# Patient Record
Sex: MALE | Race: WHITE | NOT HISPANIC OR LATINO | Employment: OTHER | URBAN - METROPOLITAN AREA
[De-identification: names, ages, dates, MRNs, and addresses within clinical notes are randomized per-mention and may not be internally consistent; named-entity substitution may affect disease eponyms.]

---

## 2017-04-25 ENCOUNTER — TELEPHONE (OUTPATIENT)
Dept: ALLERGY | Facility: CLINIC | Age: 72
End: 2017-04-25

## 2017-04-25 NOTE — TELEPHONE ENCOUNTER
"Patient has history of "angioedema" per his wife.  He is having continued swelling in his face.  She states he cannot take the Zyrtec as it makes him too drowsy and he cannot function.  They are asking for alternatives.  Also discussed patient needing an appointment for a follow up.    Spoke with SOLOMON Cotton.  She states to have patient start on Allegra one in the morning and at lunchtime, and then to take the Zyrtec in the evening.  Patient given instructions and appt made for 5/8/17  "

## 2017-04-25 NOTE — TELEPHONE ENCOUNTER
----- Message from Vicki Donahue sent at 4/24/2017  9:43 AM CDT -----  Contact: Self/[phone] home  Pt said that he is calling in regards to needing to speak with More Burton about a problem he has been having with Edema, pt stated that he is taking medication and needs advise before he makes an appointment. Please call and advise              Thank you

## 2017-05-08 ENCOUNTER — LAB VISIT (OUTPATIENT)
Dept: LAB | Facility: HOSPITAL | Age: 72
End: 2017-05-08
Payer: MEDICARE

## 2017-05-08 ENCOUNTER — OFFICE VISIT (OUTPATIENT)
Dept: ALLERGY | Facility: CLINIC | Age: 72
End: 2017-05-08
Payer: MEDICARE

## 2017-05-08 VITALS
BODY MASS INDEX: 26.15 KG/M2 | WEIGHT: 186.75 LBS | OXYGEN SATURATION: 96 % | HEIGHT: 71 IN | HEART RATE: 72 BPM | SYSTOLIC BLOOD PRESSURE: 124 MMHG | DIASTOLIC BLOOD PRESSURE: 86 MMHG

## 2017-05-08 DIAGNOSIS — T78.3XXD ANGIOEDEMA, SUBSEQUENT ENCOUNTER: ICD-10-CM

## 2017-05-08 DIAGNOSIS — J30.89 ALLERGIC RHINITIS DUE TO OTHER ALLERGEN: Primary | ICD-10-CM

## 2017-05-08 DIAGNOSIS — L50.1 CHRONIC IDIOPATHIC URTICARIA: ICD-10-CM

## 2017-05-08 PROBLEM — T78.3XXA ANGIOEDEMA: Status: ACTIVE | Noted: 2017-05-08

## 2017-05-08 PROCEDURE — 99999 PR PBB SHADOW E&M-EST. PATIENT-LVL III: CPT | Mod: PBBFAC,,, | Performed by: ALLERGY & IMMUNOLOGY

## 2017-05-08 PROCEDURE — 86376 MICROSOMAL ANTIBODY EACH: CPT

## 2017-05-08 PROCEDURE — 36415 COLL VENOUS BLD VENIPUNCTURE: CPT

## 2017-05-08 RX ORDER — MONTELUKAST SODIUM 10 MG/1
10 TABLET ORAL NIGHTLY
Qty: 30 TABLET | Refills: 11 | Status: SHIPPED | OUTPATIENT
Start: 2017-05-08 | End: 2017-06-07

## 2017-05-08 NOTE — PROGRESS NOTES
Subjective:       Patient ID: Kvng Redd is a 71 y.o. male.    Chief Complaint: Follow-up (angioedema, comes and goes, facial, last night  jaw left side, about a month ago. )    HPI Comments: Patient is known to me from private practice he is new to me in the [facility] system.  However, [facility] policy dictates chart is stored off site and not available at the time of this visit, stored at [facility]. He is followed for Chronic idiopathic urticaria, angioedema, and generalized pruritis. Antihistamines increase sedation for him, so antihistamine choices are limited for avoid day time somnolance, fatigue and malaise. He has mild occasional episodes of angioedema. However he feels he is 90% symptom managed with his current medication regemin of Zyrtec 1 in Am, Zyrtec 2 in PM. He has a friend from [place] who is an MD visiting in their home. They had additional question about the use of the following medications Singulair, Astelin, Nasonex, Alpha-1 antihistamines, amitriptyline and doxepin. Reviewed clinical effectiveness, side effects, and risk v. Benefit specific to patient. All questions answered. He has mild intermittent allergic rhinitis symptoms with increased rhinorrhea, nasal congestion, sinus pressure in the last 2 months.    Review of Systems   Constitutional: Negative.  Negative for activity change, appetite change, chills, diaphoresis, fatigue, fever and unexpected weight change.   HENT: Negative.  Negative for congestion, dental problem, drooling, ear discharge, ear pain, facial swelling, hearing loss, mouth sores, nosebleeds, postnasal drip, rhinorrhea, sinus pressure, sneezing, sore throat, tinnitus, trouble swallowing and voice change.         Chronic idiopathic angioedema, urticaria, and pruritis   Eyes: Negative.  Negative for photophobia, pain, discharge, redness, itching and visual disturbance.   Respiratory: Negative.  Negative for apnea, cough, choking, chest tightness, shortness of breath,  wheezing and stridor.    Cardiovascular: Negative.  Negative for chest pain, palpitations and leg swelling.   Gastrointestinal: Negative.  Negative for abdominal distention, abdominal pain, constipation, diarrhea, nausea and vomiting.   Endocrine: Negative.  Negative for cold intolerance, heat intolerance, polydipsia, polyphagia and polyuria.   Genitourinary: Negative.  Negative for decreased urine volume, difficulty urinating, dysuria, enuresis, frequency and urgency.   Musculoskeletal: Negative.  Negative for arthralgias, back pain, gait problem, joint swelling, myalgias, neck pain and neck stiffness.   Skin: Negative.  Negative for color change, pallor, rash and wound.   Allergic/Immunologic: Negative.  Negative for environmental allergies, food allergies and immunocompromised state.   Neurological: Negative.  Negative for dizziness, tremors, seizures, syncope, facial asymmetry, speech difficulty, weakness, light-headedness, numbness and headaches.   Hematological: Negative.  Negative for adenopathy. Does not bruise/bleed easily.   Psychiatric/Behavioral: Negative.  Negative for agitation, behavioral problems, confusion, decreased concentration, dysphoric mood, hallucinations, self-injury, sleep disturbance and suicidal ideas. The patient is not nervous/anxious and is not hyperactive.      Objective:   Physical Exam   Constitutional: He is oriented to person, place, and time. He appears well-developed and well-nourished. He is active and cooperative.  Non-toxic appearance. He does not have a sickly appearance. He does not appear ill. No distress.   HENT:   Head: Normocephalic and atraumatic. Head is without abrasion, without right periorbital erythema and without left periorbital erythema.   Right Ear: Hearing, tympanic membrane, external ear and ear canal normal. No lacerations. No drainage, swelling or tenderness. No foreign bodies. No mastoid tenderness. Tympanic membrane is not injected, not scarred, not  perforated, not erythematous, not retracted and not bulging. Tympanic membrane mobility is normal. No middle ear effusion. No decreased hearing is noted.   Left Ear: Hearing, tympanic membrane, external ear and ear canal normal. No lacerations. No drainage, swelling or tenderness. No foreign bodies. No mastoid tenderness. Tympanic membrane is not injected, not scarred, not perforated, not erythematous, not retracted and not bulging. Tympanic membrane mobility is normal.  No middle ear effusion. No decreased hearing is noted.   Nose: Nose normal. No mucosal edema, rhinorrhea, nose lacerations, sinus tenderness, nasal deformity, septal deviation or nasal septal hematoma. No epistaxis.  No foreign bodies. Right sinus exhibits no maxillary sinus tenderness and no frontal sinus tenderness. Left sinus exhibits no maxillary sinus tenderness and no frontal sinus tenderness.   Mouth/Throat: Uvula is midline, oropharynx is clear and moist and mucous membranes are normal. He does not have dentures. No oral lesions. No trismus in the jaw. No dental abscesses, uvula swelling, lacerations or dental caries. No oropharyngeal exudate, posterior oropharyngeal edema, posterior oropharyngeal erythema or tonsillar abscesses. No tonsillar exudate.   No angioedema, urticaria, or pruritis present today   Eyes: Conjunctivae, EOM and lids are normal. Pupils are equal, round, and reactive to light. Right eye exhibits no chemosis, no discharge and no exudate. Left eye exhibits no chemosis, no discharge and no exudate. Right conjunctiva is not injected. Right conjunctiva has no hemorrhage. Left conjunctiva is not injected. Left conjunctiva has no hemorrhage. No scleral icterus.   Neck: Trachea normal, normal range of motion, full passive range of motion without pain and phonation normal. No tracheal tenderness and no muscular tenderness present. No rigidity. No tracheal deviation, no edema, no erythema and normal range of motion present. No  thyroid mass and no thyromegaly present.   Cardiovascular: Normal rate, regular rhythm, normal heart sounds and normal pulses.  Exam reveals no gallop and no friction rub.    No murmur heard.  Pulmonary/Chest: Effort normal and breath sounds normal. No accessory muscle usage or stridor. No apnea, no tachypnea and no bradypnea. He has no decreased breath sounds. He has no wheezes. He has no rhonchi. He has no rales. He exhibits no tenderness.   Abdominal: Soft. Normal appearance and bowel sounds are normal. He exhibits no distension. There is no tenderness. There is no rigidity, no rebound, no guarding and no CVA tenderness.   Musculoskeletal: Normal range of motion. He exhibits no edema, tenderness or deformity.   Lymphadenopathy:        Head (right side): No submental, no submandibular, no tonsillar, no preauricular, no posterior auricular and no occipital adenopathy present.        Head (left side): No submental, no submandibular, no tonsillar, no preauricular, no posterior auricular and no occipital adenopathy present.     He has no cervical adenopathy.        Right cervical: No superficial cervical, no deep cervical and no posterior cervical adenopathy present.       Left cervical: No superficial cervical, no deep cervical and no posterior cervical adenopathy present.        Right: No supraclavicular and no epitrochlear adenopathy present.        Left: No supraclavicular and no epitrochlear adenopathy present.   Neurological: He is alert and oriented to person, place, and time. He has normal strength. He is not disoriented. No cranial nerve deficit. He exhibits normal muscle tone. Coordination and gait normal.   Skin: Skin is warm and dry. No abrasion, no bruising, no laceration, no lesion, no petechiae, no purpura and no rash noted. Rash is not macular, not papular, not maculopapular, not nodular, not pustular, not vesicular and not urticarial. He is not diaphoretic. No cyanosis or erythema. No pallor. Nails  show no clubbing.   Psychiatric: He has a normal mood and affect. His speech is normal and behavior is normal. Judgment and thought content normal. His mood appears not anxious. His affect is not inappropriate. Cognition and memory are normal. He does not express impulsivity or inappropriate judgment. He expresses no homicidal and no suicidal ideation. He is attentive.   Nursing note and vitals reviewed.    Assessment:     1. Allergic rhinitis due to other allergen    2. Angioedema, subsequent encounter    3. Chronic idiopathic urticaria      Plan:   Kvng was seen today for follow-up.    Diagnoses and all orders for this visit:    Allergic rhinitis due to other allergen  -     montelukast (SINGULAIR) 10 mg tablet; Take 1 tablet (10 mg total) by mouth every evening.    Angioedema, subsequent encounter  -     montelukast (SINGULAIR) 10 mg tablet; Take 1 tablet (10 mg total) by mouth every evening.  -     CU (Chronic Urticaria) Index Panel; Future    Chronic idiopathic urticaria  -     montelukast (SINGULAIR) 10 mg tablet; Take 1 tablet (10 mg total) by mouth every evening.  -     CU (Chronic Urticaria) Index Panel; Future    Continue Zyrtec 10 1 tab in the AM with Allegra 180 mg 1 tab midday with Zyrtec 10 mg 2 tabs at hour of sleep  Start Singulair 10 mg 1 tab every 24 hours    Continue Benadryl 25-50 mg 1-2 tab every 4 hours as needed for break through itching/swelling  Lab work for CU index        Other options for symptom management:    Zantac or Pepcid is an H2 blocker helps in some cases.    Consider Doxepin at a later date, but is sedating    Astelin topically nasally would be minimally helpful only with eye swelling and immediate area around the nasal passageway    Return in about 6 months (around 11/8/2017) for as long as well, sooner if symptomatic..    Discussed options and strategies  Reviewed medications risk v. Benefit  Reviewed pathophysiology  All questions answered  Emotional support provided  Pt  verbalized understanding of all the above and treatment plan.      SOLOMON Gutierrez

## 2017-05-08 NOTE — PATIENT INSTRUCTIONS
Continue Zyrtec 10 1 tab in the AM with Allegra 180 mg 1 tab midday with Zyrtec 10 mg 2 tabs at hour of sleep  Start Singulair 10 mg 1 tab every 24 hours    Continue Benadryl 25-50 mg 1-2 tab every 4 hours as needed for break through itching/swelling  Lab work for CU index        Other options for symptom management:    Zantac or Pepcid is an H2 blocker helps in some cases.    Consider Doxepin at a later date, but is sedating    Astelin topically nasally would be helpful only with eye swelling and immediate area around the nasal passageway

## 2017-05-08 NOTE — MR AVS SNAPSHOT
[facility]  [address]  [facility] [address]  Phone: [phone]  Fax: [phone]                  Kvng Redd   2017 9:30 AM   Office Visit    Description:  Male : 1945   Provider:  SOLOMON Gutierrez   Department:  [facility]           Reason for Visit     Follow-up           Diagnoses this Visit        Comments    Allergic rhinitis due to other allergen    -  Primary     Angioedema, subsequent encounter         Chronic idiopathic urticaria                To Do List           Goals (5 Years of Data)     None      Follow-Up and Disposition     Return in about 6 months (around 2017) for as long as well, sooner if symptomatic..       These Medications        Disp Refills Start End    montelukast (SINGULAIR) 10 mg tablet 30 tablet 11 2017    Take 1 tablet (10 mg total) by mouth every evening. - Oral    Pharmacy: [facility] - [address] Ph #: [phone]         [facility]     [facility] On Call Nurse Care Line -  Assistance  Unless otherwise directed by your provider, please contact [facility], our nurse care line that is available for  assistance.     Registered nurses in the [facility] Center provide: appointment scheduling, clinical advisement, health education, and other advisory services.  Call: [phone] (toll free)               Medications           Message regarding Medications     Verify the changes and/or additions to your medication regime listed below are the same as discussed with your clinician today.  If any of these changes or additions are incorrect, please notify your healthcare provider.        START taking these NEW medications        Refills    montelukast (SINGULAIR) 10 mg tablet 11    Sig: Take 1 tablet (10 mg total) by mouth every evening.    Class: Normal    Route: Oral      STOP taking these medications     B-complex  "with vitamin C (Z-BEC OR EQUIV) tablet Take 1 tablet by mouth once daily.    UNABLE TO FIND medication name: Glucosamine HCL 1,500 - MSM 1,500   Take one by mouth two times per day    PATADAY 0.2 % Drop            Verify that the below list of medications is an accurate representation of the medications you are currently taking.  If none reported, the list may be blank. If incorrect, please contact your healthcare provider. Carry this list with you in case of emergency.           Current Medications     doxazosin (CARDURA) 4 MG tablet     ibuprofen (ADVIL,MOTRIN) 200 MG tablet Take 800 mg by mouth every 6 (six) hours as needed for Pain.    MULTIVITAMIN ORAL Take by mouth.    [id number] oil 786--800 mg-mg-mcg-mcg Cap Take 600 mg by mouth once daily.    montelukast (SINGULAIR) 10 mg tablet Take 1 tablet (10 mg total) by mouth every evening.           Clinical Reference Information           Your Vitals Were     BP Pulse Height Weight SpO2 BMI    124/86 (BP Location: Right arm, Patient Position: Sitting, BP Method: Manual) 72 5' 11" (1.803 m) 84.7 kg (186 lb 11.7 oz) 96% 26.04 kg/m2      Blood Pressure          Most Recent Value    BP  124/86      Allergies as of 5/8/2017     No Known Allergies      Immunizations Administered on Date of Encounter - 5/8/2017     None      Orders Placed During Today's Visit     Future Labs/Procedures Expected by Expires    CU (Chronic Urticaria) Index Panel  5/8/2017 7/7/2018      [facility] Sign-Up     Activating your [facility] account is as easy as 1-2-3!     1) Visit [email or web address], select Sign Up Now, enter this activation code and your date of birth, then select Next.  [email or web address]  Expires: 6/22/2017 11:13 AM      2) Create a username and password to use when you visit [facility] in the future and select a security question in case you lose your password and select Next.    3) Enter your e-mail address and click Sign Up!    Additional Information  If you " have questions, please e-mail [email or web address] or call [phone] to talk to our [facility] staff. Remember, [facility] is NOT to be used for urgent needs. For medical emergencies, dial 911.         Instructions    Continue Zyrtec 10 1 tab in the AM with Allegra 180 mg 1 tab midday with Zyrtec 10 mg 2 tabs at hour of sleep  Start Singulair 10 mg 1 tab every 24 hours    Continue Benadryl 25-50 mg 1-2 tab every 4 hours as needed for break through itching/swelling  Lab work for CU index        Other options for symptom management:    Zantac or Pepcid is an H2 blocker helps in some cases.    Consider Doxepin at a later date, but is sedating    Astelin topically nasally would be helpful only with eye swelling and immediate area around the nasal passageway         Language Assistance Services     ATTENTION: Language assistance services are available, free of charge. Please call [phone].      ATENCIÓN: Si habla kirk, tiene a tavera disposición servicios gratuitos de asistencia lingüística. Llame al [phone].     CHÚ Ý: N?u b?n nói Ti?ng Vi?t, có các d?ch v? h? tr? ngôn ng? mi?n phí dành cho b?n. G?i s? [phone].         [facility] complies with applicable Federal civil rights laws and does not discriminate on the basis of race, color, national origin, age, disability, or sex.

## 2017-05-12 LAB
CU INDEX: 2
CU, ANTI-THYROGLOBULIN IGG: <20 IU/ML
CU, ANTI-THYROID PEROXIDASE IGG: 14 IU/ML
CU, TSH (THYROTROPIN): 1.57 UIU/ML (ref 0.4–4)

## 2017-11-09 ENCOUNTER — OFFICE VISIT (OUTPATIENT)
Dept: ALLERGY | Facility: CLINIC | Age: 72
End: 2017-11-09
Payer: MEDICARE

## 2017-11-09 VITALS
SYSTOLIC BLOOD PRESSURE: 122 MMHG | DIASTOLIC BLOOD PRESSURE: 72 MMHG | HEIGHT: 71 IN | WEIGHT: 190.69 LBS | OXYGEN SATURATION: 98 % | HEART RATE: 76 BPM | BODY MASS INDEX: 26.7 KG/M2

## 2017-11-09 DIAGNOSIS — T78.3XXA ANGIOEDEMA, INITIAL ENCOUNTER: Primary | Chronic | ICD-10-CM

## 2017-11-09 DIAGNOSIS — J30.89 ALLERGIC RHINITIS DUE TO HOUSE DUST MITE: Chronic | ICD-10-CM

## 2017-11-09 DIAGNOSIS — L29.8 OTHER PRURITUS: Chronic | ICD-10-CM

## 2017-11-09 DIAGNOSIS — L50.1 CHRONIC IDIOPATHIC URTICARIA: Chronic | ICD-10-CM

## 2017-11-09 PROCEDURE — 99214 OFFICE O/P EST MOD 30 MIN: CPT | Mod: S$GLB,,, | Performed by: ALLERGY & IMMUNOLOGY

## 2017-11-09 PROCEDURE — 99999 PR PBB SHADOW E&M-EST. PATIENT-LVL III: CPT | Mod: PBBFAC,,, | Performed by: ALLERGY & IMMUNOLOGY

## 2017-11-09 RX ORDER — GLUCOSAMINE/CHONDRO SU A 500-400 MG
1 TABLET ORAL DAILY
COMMUNITY

## 2017-11-09 RX ORDER — LANOLIN ALCOHOL/MO/W.PET/CERES
1000 CREAM (GRAM) TOPICAL DAILY
COMMUNITY

## 2017-11-09 RX ORDER — MONTELUKAST SODIUM 10 MG/1
TABLET ORAL
COMMUNITY
Start: 2017-10-07 | End: 2018-02-28 | Stop reason: SDUPTHER

## 2017-11-09 RX ORDER — CYANOCOBALAMIN (VITAMIN B-12) 500 MCG
10 TABLET ORAL NIGHTLY
COMMUNITY
End: 2022-09-07 | Stop reason: DRUGHIGH

## 2017-11-09 RX ORDER — CETIRIZINE HYDROCHLORIDE 10 MG/1
10 TABLET ORAL NIGHTLY
COMMUNITY
End: 2022-01-20 | Stop reason: SDUPTHER

## 2017-11-09 NOTE — LETTER
November 13, 2017        Hemanth Rajan MD  [address] [place] [address] [place] [address]             [place] - All[facility]  [address] [facility]  [place] [address]  Phone: [phone]   Patient: Kvng Redd   MR Number: [id number]   YOB: 1945   Date of Visit: 11/9/2017       Dear Dr. Rajan:    I saw your patient today for a follow-up visit with respect to the following conditions:    1. Angioedema: Active treatment; symptoms quiescent     2. Chronic idiopathic urticaria: Quiescent     3. Allergic rhinitis due to house dust mite: Quiescent on medication     4. Generalized pruritus: Quiescent on medication         I have made the following changes in medications:    Patient Instructions   1.  Patient is to continue Singulair 10 mg every 24 hours  2.  Patient may either use Zyrtec 10 mg twice a day or Zyrtec 10 mg 2 tablets at bedtime.  At a later date he may also try decreasing his Zyrtec to just 10 mg at bedtime.  3.  Safety of these medicines long-term was discussed with the patient.  Revisit is requested as needed.  Patient understands he may contact me if there are problems.      I have requested a follow-up visit as needed to assess this patient's progress.    I hope you find this information helpful in the care of your patient. If you have questions about the above, please do not hesitate to contact me.    Sincerely,    Latisha Garsia MD      [facility]

## 2017-11-09 NOTE — PROGRESS NOTES
Referring physician: No ref. provider found    Primary Care Physician: Hemanth Rajan MD    Chief Complaint: Follow-up (no issues since starting on singulair and montelukast, ) and Allergic Rhinitis  (seems to be controlled with Zyrtec)    Patient is known to me. The last visit was 5/8/2017  Patient is accompanied: No  Diagnosis at previous visit:  1. Allergic rhinitis due to other allergen    2. Angioedema, subsequent encounter    3. Chronic idiopathic urticaria        Subjective:         HPI    Kvng Redd is a 72 y.o. male here for a follow-up visit related to angioedema and ALLERGIC rhinitis treatment.  Patient states that since he saw Priscilla on 5/8/2017 and started Singulair 10 mg every 24 hours along with Zyrtec 10 mg twice a day he has had no episodes of angioedema nor has he had any issues with his ALLERGIC rhinitis.  He states his combination of medicine controls his symptoms completely.  However he does state that he feels somewhat sedated during the daytime; he thinks this may be related to taking Zyrtec in the a.m.  He is wondering if there is another option for taking his Zyrtec.  Patient has positive prick skin test to  dust mites on 4/26/2016.  His lab for angioedema done at [facility] on 4/20/2016 was all normal; his CU index done at [facility] was also normal.  His prick skin test to foods on 4/26 /2016 was negative.    Summary of visit on 5/8/2017 with [facility]:  Follow-up (angioedema, comes and goes, facial, last night  jaw left side, about a month ago. )  HPI Comments: Patient is known to me from private practice he is new to me in the [facility] system.  However, [facility] policy dictates chart is stored off site and not available at the time of this visit, stored at [facility]. He is followed for Chronic idiopathic urticaria, angioedema, and generalized pruritis. Antihistamines increase sedation for him, so antihistamine choices are limited for avoid day time somnolance, fatigue and malaise. He has  mild occasional episodes of angioedema. However he feels he is 90% symptom managed with his current medication regemin of Zyrtec 1 in Am, Zyrtec 2 in PM. He has a friend from [place] who is an MD visiting in their home. They had additional question about the use of the following medications Singulair, Astelin, Nasonex, Alpha-1 antihistamines, amitriptyline and doxepin. Reviewed clinical effectiveness, side effects, and risk v. Benefit specific to patient. All questions answered. He has mild intermittent allergic rhinitis symptoms with increased rhinorrhea, nasal congestion, sinus pressure in the last 2 months.  Assessment:  1. Allergic rhinitis due to other allergen    2. Angioedema, subsequent encounter    3. Chronic idiopathic urticaria    Plan:  Continue Zyrtec 10 1 tab in the AM with Allegra 180 mg 1 tab midday with Zyrtec 10 mg 2 tabs at hour of sleep  Start Singulair 10 mg 1 tab every 24 hours  Continue Benadryl 25-50 mg 1-2 tab every 4 hours as needed for break through itching/swelling  Lab work for CU index  Component      Latest Ref Rng & Units 5/8/2017   CU, Anti-Thyroid Peroxidase IgG      <35 IU/mL 14   CU, Anti-Thyroglobulin IgG      <40 IU/mL <20   CU, Tsh (Thyrotropin)      0.4 - 4 uIU/mL 1.57   CU Index      <10 2.0         Review of Systems  Constitutional: Negative for changes in appetite, unintentional weight loss, fever, chills and fatigue.   HENT: Negative for facial pain, nose bleeds, nasal congestion, postnasal drip, throat clearing, sinus pressure, and voice change. Negative for ear discharge, ear pain, facial swelling, sore throat and trouble swallowing.  Eyes: Negative for occular discharge, redness, itching and visual disturbance.  Respiratory: Negative for chest tightness, shortness of breath, wheezing, dyspnea on exertion, sputum production and cough.   Cardiovascular: Negative for chest pain, palpatations and leg swelling.  Gastrointestinal: Negative for abdominal distension,  abdominal pain, constipation, diarrhea, nausea,and vomiting.   Genitourinary: Negative for difficulty urinating.   Musculoskeletal: Negative for arthralgias, gait problems, joint swelling, myalgias and back pain.   Neurological: Negative for dizziness, syncope, weakness, light-headedness, and headaches.   Hematological: Negative for adenopathy, does not bruise or bleed easily.  Psychiatric/Behavioral: Negative for agitation, anxiety, behavioral problems, confusion, and insomnia.  Skin: Negative for rash.     PMH:  Reviewed with the patient and current for this visit    Family History:  Reviewed with the patient and current for this visit    Social History:  Reviewed with the patient and current for this visit     Environmental History:  Reviewed with the patient and current for this visit          Objective:      Skin Test results: Positive to house dust mites; test was prick skin test done on 4/28/2016.  Skin test for foods done on the same day was negative.The positive histamine control (3+) and the negative saline control (0), indicate that this was a valid assessment of allergic recognition.    Immunotherapy: Patient has not been on allergen specific immunotherapy    Physical Exam  General:patient is well developed and well nourished, in no acute distress.  Mental Status:  Alert, oriented and cooperative  Head and Face: normocephalic   Allergic shiners: No  Eyes:   Pupils: ERRLA: Scleral conjunctiva: clear; Cornea: clear; Palprebal conjunctiva: normal: Eyelid Skin: normal  Ears:Tympanic membrane Left:intact and normal light reflex; Right:intact and normal light reflex; External canals normal bilaterally.  Nose:  Nares: patent; Mucosa :pink and moist; Nasal septum: midline;Turbinates are of normal size bilaterally and do not occlude the nasal airway.  Mouth/Pharynx: tonsils present; posterior pharyngeal wall normal; tongue normal; teeth normal; voice quality normal.  Neck:  Cervical lymph nodes: small,  non-tender, freely moveable both anterior and posterior cervical chain; Trachea: midline; Masses: none  Lungs: Air movement is good; respiratory effort is good; no respiratory distress; breath sounds are vesicular in all lung fields; no wheezing; normal expiratory time; no cough.  Heart: regular rate and rhythm with mild respiratory variation; A1 and P2 are normal; no murmurs or gallops.  Abdomen:exam not done  Extremities: no cyanosis, clubbing or edema  Skin:no rashes or lesions present; skin hydrated and supple.    Laboratory Results:  Component      Latest Ref Rng & Units 5/8/2017   CU, Anti-Thyroid Peroxidase IgG      <35 IU/mL 14   CU, Anti-Thyroglobulin IgG      <40 IU/mL <20   CU, Tsh (Thyrotropin)      0.4 - 4 uIU/mL 1.57   CU Index      <10 2.0             Assessment:       1. Angioedema: Active treatment; symptoms quiescent     2. Chronic idiopathic urticaria: Quiescent     3. Allergic rhinitis due to house dust mite: Quiescent on medication     4. Generalized pruritus: Quiescent on medication             Plan:         Return for re-visit: Return if symptoms worsen or fail to improve.    Immunotherapy: No    Lab or X-ray: No    Outside medical records requested: Yes        Patient Instructions   1.  Patient is to continue Singulair 10 mg every 24 hours  2.  Patient may either use Zyrtec 10 mg twice a day or Zyrtec 10 mg 2 tablets at bedtime.  At a later date he may also try decreasing his Zyrtec to just 10 mg at bedtime.  3.  Safety of these medicines long-term was discussed with the patient.  Revisit is requested as needed.  Patient understands he may contact me if there are problems.    25 minutes spent face to face with this patient. 50% of time spent counseling this patient about the medical conditions (pathophysiology), the options and strategies for treatments, and the risks and benefits of treatments.    Patient education content included: reviewied the pathophysiology of urticaria and angioedema  as well as the various etiologies this condition.  The role of laboratory studies in identifying the etiology was reviewed. The various treatment strategies and options reviewed. The patient medications were reviewed including the importance of achieving a symptom-free state on these medications.  The safety of these medications for long-term use was reviewed with the patient his laboratory values were also reviewed with patient.  Patient understands that he has idiopathic angioedema with occasional urticaria.  The role of Xolair was also reviewed. Written handout given. Questions answered.    Letter and copy of this visit sent to referring physician/PCP:            Latisha Garsia MD

## 2017-11-13 NOTE — PATIENT INSTRUCTIONS
1.  Patient is to continue Singulair 10 mg every 24 hours  2.  Patient may either use Zyrtec 10 mg twice a day or Zyrtec 10 mg 2 tablets at bedtime.  At a later date he may also try decreasing his Zyrtec to just 10 mg at bedtime.  3.  Safety of these medicines long-term was discussed with the patient.  Revisit is requested as needed.  Patient understands he may contact me if there are problems.

## 2018-02-21 RX ORDER — MONTELUKAST SODIUM 10 MG/1
TABLET ORAL
Qty: 90 TABLET | Refills: 11 | Status: CANCELLED | OUTPATIENT
Start: 2018-02-21

## 2018-02-28 DIAGNOSIS — T78.3XXD ANGIOEDEMA, SUBSEQUENT ENCOUNTER: Primary | ICD-10-CM

## 2018-02-28 RX ORDER — MONTELUKAST SODIUM 10 MG/1
10 TABLET ORAL DAILY
Qty: 30 TABLET | Refills: 11 | Status: SHIPPED | OUTPATIENT
Start: 2018-02-28 | End: 2018-05-02 | Stop reason: SDUPTHER

## 2018-03-01 ENCOUNTER — TELEPHONE (OUTPATIENT)
Dept: ALLERGY | Facility: CLINIC | Age: 73
End: 2018-03-01

## 2018-03-01 NOTE — TELEPHONE ENCOUNTER
----- Message from Molly Tracey MA sent at 3/1/2018  9:14 AM CST -----  Contact: Wife/[phone]  Pt's wife stated that the pt's rx for Montelukast was sent to the incorrect pharmacy. She would like to have the rx faxed to [facility] on file. Please advise.    Thanks

## 2018-03-05 DIAGNOSIS — T78.3XXD ANGIOEDEMA, SUBSEQUENT ENCOUNTER: ICD-10-CM

## 2018-03-05 RX ORDER — MONTELUKAST SODIUM 10 MG/1
10 TABLET ORAL DAILY
Qty: 30 TABLET | Refills: 11 | OUTPATIENT
Start: 2018-03-05

## 2018-03-06 RX ORDER — MONTELUKAST SODIUM 10 MG/1
10 TABLET ORAL NIGHTLY
Qty: 90 TABLET | Refills: 3 | Status: SHIPPED | OUTPATIENT
Start: 2018-03-06 | End: 2018-05-02 | Stop reason: SDUPTHER

## 2018-05-02 ENCOUNTER — OFFICE VISIT (OUTPATIENT)
Dept: ALLERGY | Facility: CLINIC | Age: 73
End: 2018-05-02
Payer: MEDICARE

## 2018-05-02 VITALS
HEART RATE: 88 BPM | DIASTOLIC BLOOD PRESSURE: 70 MMHG | BODY MASS INDEX: 25.77 KG/M2 | WEIGHT: 184.06 LBS | HEIGHT: 71 IN | OXYGEN SATURATION: 93 % | SYSTOLIC BLOOD PRESSURE: 110 MMHG

## 2018-05-02 DIAGNOSIS — H10.45 OTHER CHRONIC ALLERGIC CONJUNCTIVITIS OF BOTH EYES: Chronic | ICD-10-CM

## 2018-05-02 DIAGNOSIS — T78.3XXA ANGIOEDEMA, INITIAL ENCOUNTER: Primary | Chronic | ICD-10-CM

## 2018-05-02 DIAGNOSIS — J30.89 ALLERGIC RHINITIS DUE TO HOUSE DUST MITE: Chronic | ICD-10-CM

## 2018-05-02 DIAGNOSIS — L50.1 CHRONIC IDIOPATHIC URTICARIA: Chronic | ICD-10-CM

## 2018-05-02 PROCEDURE — 99214 OFFICE O/P EST MOD 30 MIN: CPT | Mod: S$GLB,,, | Performed by: ALLERGY & IMMUNOLOGY

## 2018-05-02 PROCEDURE — 99999 PR PBB SHADOW E&M-EST. PATIENT-LVL III: CPT | Mod: PBBFAC,,, | Performed by: ALLERGY & IMMUNOLOGY

## 2018-05-02 RX ORDER — MONTELUKAST SODIUM 10 MG/1
10 TABLET ORAL NIGHTLY
Qty: 90 TABLET | Refills: 3 | Status: SHIPPED | OUTPATIENT
Start: 2018-05-02 | End: 2019-05-17 | Stop reason: SDUPTHER

## 2018-05-02 NOTE — PATIENT INSTRUCTIONS
1.  Patient is to continue Singulair 10 mg at bedtime along with Zyrtec 10 mg at bedtime.  2.  For breakthrough symptoms, I've advised the patient to take her Claritin 10 mg or Allegra 180 mg on an as-needed basis provided they do not sedate him.  3.  Patient understands I will retire in June 2018 but will be available for medical care until the end of June.  I have given him his [facility] medical chart at this visit.  Patient plans follow-up with Dr. Bettencourt.

## 2018-05-02 NOTE — PROGRESS NOTES
Referring physician: No ref. provider found    Primary Care Physician: Hemanth Rajan MD    Chief Complaint: Follow-up and Urticaria    Patient is known to me. The last visit was 11/9/2017  Patient is accompanied: No  Diagnosis at previous visit:  1. Angioedema: Active treatment; symptoms quiescent      2. Chronic idiopathic urticaria: Quiescent      3. Allergic rhinitis due to house dust mite: Quiescent on medication      4. Generalized pruritus: Quiescent on medication         Subjective:         HPI    Kvng Redd is a 72 y.o. male here for a follow-up visit related to ongoing treatment for recurrent angioedema and ALLERGIC rhinoconjunctivitis.  Patient states that since starting Singulair in May 2017 he has had much less problems with recurrent episodes of angioedema.  Patient states that he's had 2 recent episodes of lip tingling but no associated swelling.  He states that prior to that he did have one episode of swelling of his forehead since his previous visit.  His current medications include Singulair 10 mg at bedtime along with Zyrtec 20 mg at bedtime.  Patient does not use Zyrtec in a.m. because of sedation.  Patient also reports an upper respiratory tract infection around Mardi Gras 2018 for which he was seen at urgent care.  He was given a steroid injection and antibiotic for treatment of his symptoms; he also reports using Nasonex at this time.  He states his symptoms resolved very promptly with treatment.  He also reports that he's had no ocular symptoms or nasal symptoms since his last visit.    Patient also reports that he is planning to change his primary care doctor back to Dr. Garica at [facility] in the near future.    Summary of visit on 11/9/2017 with me:  Kvng Redd is a 72 y.o. male here for a follow-up visit related to angioedema and ALLERGIC rhinitis treatment.  Patient states that since he saw Priscilla on 5/8/2017 and started Singulair 10 mg every 24 hours along with  Zyrtec 10 mg twice a day he has had no episodes of angioedema nor has he had any issues with his ALLERGIC rhinitis.  He states his combination of medicine controls his symptoms completely.  However he does state that he feels somewhat sedated during the daytime; he thinks this may be related to taking Zyrtec in the a.m.  He is wondering if there is another option for taking his Zyrtec.  Patient has positive prick skin test to  dust mites on 4/26/2016.  His lab for angioedema done at [facility] on 4/20/2016 was all normal; his CU index done at [facility] was also normal.  His prick skin test to foods on 4/26 /2016 was negative.  Assessment:  1. Angioedema: Active treatment; symptoms quiescent      2. Chronic idiopathic urticaria: Quiescent      3. Allergic rhinitis due to house dust mite: Quiescent on medication      4. Generalized pruritus: Quiescent on medication     Plan:  Patient is to continue Singulair 10 mg every 24 hours  2.  Patient may either use Zyrtec 10 mg twice a day or Zyrtec 10 mg 2 tablets at bedtime.  At a later date he may also try decreasing his Zyrtec to just 10 mg at bedtime.  3.  Safety of these medicines long-term was discussed with the patient.  Revisit is requested as needed.  Patient understands he may contact me if there are problems.      Summary of visit on 5/8/2017 with [facility]:  Follow-up (angioedema, comes and goes, facial, last night  jaw left side, about a month ago. )  HPI Comments: Patient is known to me from private practice he is new to me in the [facility] system.  However, [facility] policy dictates chart is stored off site and not available at the time of this visit, stored at [facility]. He is followed for Chronic idiopathic urticaria, angioedema, and generalized pruritis. Antihistamines increase sedation for him, so antihistamine choices are limited for avoid day time somnolance, fatigue and malaise. He has mild occasional episodes of angioedema. However he feels he is 90% symptom managed with his  current medication regemin of Zyrtec 1 in Am, Zyrtec 2 in PM. He has a friend from [place] who is an MD visiting in their home. They had additional question about the use of the following medications Singulair, Astelin, Nasonex, Alpha-1 antihistamines, amitriptyline and doxepin. Reviewed clinical effectiveness, side effects, and risk v. Benefit specific to patient. All questions answered. He has mild intermittent allergic rhinitis symptoms with increased rhinorrhea, nasal congestion, sinus pressure in the last 2 months.  Assessment:  1. Allergic rhinitis due to other allergen    2. Angioedema, subsequent encounter    3. Chronic idiopathic urticaria    Plan:  Continue Zyrtec 10 1 tab in the AM with Allegra 180 mg 1 tab midday with Zyrtec 10 mg 2 tabs at hour of sleep  Start Singulair 10 mg 1 tab every 24 hours  Continue Benadryl 25-50 mg 1-2 tab every 4 hours as needed for break through itching/swelling  Lab work for CU index  Component      Latest Ref Rng & Units 5/8/2017   CU, Anti-Thyroid Peroxidase IgG      <35 IU/mL 14   CU, Anti-Thyroglobulin IgG      <40 IU/mL <20   CU, Tsh (Thyrotropin)      0.4 - 4 uIU/mL 1.57   CU Index      <10 2.0        Review of Systems  Constitutional: Negative for changes in appetite, unintentional weight loss, fever, chills and fatigue.   HENT: Negative for facial pain, nose bleeds, nasal congestion, postnasal drip, throat clearing, sinus pressure, and voice change. Negative for ear discharge, ear pain, facial swelling, sore throat and trouble swallowing.  Eyes: Negative for occular discharge, redness, itching and visual disturbance.  Respiratory: Negative for chest tightness, shortness of breath, wheezing, dyspnea on exertion, sputum production and cough.   Cardiovascular: Negative for chest pain, palpatations and leg swelling.  Gastrointestinal: Negative for abdominal distension, abdominal pain, constipation, diarrhea, nausea,and vomiting.   Genitourinary: Negative for  difficulty urinating.   Musculoskeletal: Negative for arthralgias, gait problems, joint swelling, myalgias and back pain.   Neurological: Negative for dizziness, syncope, weakness, light-headedness, and headaches.   Hematological: Negative for adenopathy, does not bruise or bleed easily.  Psychiatric/Behavioral: Negative for agitation, anxiety, behavioral problems, confusion, and insomnia.  Skin: Positive for angioedema as above.     PMH:  Reviewed with the patient and current for this visit    Family History:  Reviewed with the patient and current for this visit    Social History:  Reviewed with the patient and current for this visit     Environmental History:  Reviewed with the patient and current for this visit          Objective:      Skin Test results: Positive to house dust mites; test was prick skin test done on 4/28/2016.  Skin test for foods done on the same day was negative.The positive histamine control (3+) and the negative saline control (0), indicate that this was a valid assessment of allergic recognition.     Immunotherapy: Patient has not been on allergen specific immunotherapy     Physical Exam  General:patient is well developed and well nourished, in no acute distress.  Mental Status:  Alert, oriented and cooperative  Head and Face: normocephalic   Allergic shiners: No  Eyes:   Pupils: ERRLA: Scleral conjunctiva: clear; Cornea: clear; Palprebal conjunctiva: normal: Eyelid Skin: normal  Ears:Tympanic membrane Left:intact and normal light reflex; Right:intact and normal light reflex; External canals normal bilaterally.  Nose:  Nares: patent; Mucosa :pink and moist; Nasal septum: midline;Turbinates are of normal size bilaterally and do not occlude the nasal airway.  Mouth/Pharynx: tonsils present; posterior pharyngeal wall normal; tongue normal; teeth normal; voice quality normal.  Neck:  Cervical lymph nodes: small, non-tender, freely moveable both anterior and posterior cervical chain; Trachea:  midline; Masses: none  Lungs: Air movement is good; respiratory effort is good; no respiratory distress; breath sounds are vesicular in all lung fields; no wheezing; normal expiratory time; no cough.  Heart: regular rate and rhythm with mild respiratory variation; A1 and P2 are normal; no murmurs or gallops.  Abdomen:exam not done  Extremities: no cyanosis, clubbing or edema  Skin:no rashes or lesions present; skin hydrated and supple.         Assessment:       1. Angioedema: Active treatment; mild breakthrough symptoms despite medications     2. Chronic idiopathic urticaria: Quiescent     3. Allergic rhinitis due to house dust mite: Quiescent on medication     4. Chronic allergic conjunctivitis of both eyes: Quiescent on medication             Plan:         Return for re-visit: Follow-up if symptoms worsen or fail to improve.    Immunotherapy: No    Lab or X-ray: No    Outside medical records requested: No        Patient Instructions   1.  Patient is to continue Singulair 10 mg at bedtime along with Zyrtec 10 mg at bedtime.  2.  For breakthrough symptoms, I've advised the patient to take her Claritin 10 mg or Allegra 180 mg on an as-needed basis provided they do not sedate him.  3.  Patient understands I will retire in June 2018 but will be available for medical care until the end of June.  I have given him his [facility] medical chart at this visit.  Patient plans follow-up with Dr. Bettencourt.     25 minutes spent face to face with this patient. 50% of time spent counseling this patient about the medical conditions (pathophysiology), the options and strategies for treatments, and the risks and benefits of treatments.    Patient education content included: safety of medication long-term and the addition of Claritin or Allegra as needed.             Latisha Garsia MD

## 2018-06-15 ENCOUNTER — TELEPHONE (OUTPATIENT)
Dept: INTERNAL MEDICINE | Facility: CLINIC | Age: 73
End: 2018-06-15

## 2018-10-08 ENCOUNTER — LAB VISIT (OUTPATIENT)
Dept: LAB | Facility: HOSPITAL | Age: 73
End: 2018-10-08
Attending: INTERNAL MEDICINE
Payer: MEDICARE

## 2018-10-08 ENCOUNTER — OFFICE VISIT (OUTPATIENT)
Dept: INTERNAL MEDICINE | Facility: CLINIC | Age: 73
End: 2018-10-08
Payer: MEDICARE

## 2018-10-08 VITALS
TEMPERATURE: 98 F | RESPIRATION RATE: 18 BRPM | HEART RATE: 63 BPM | DIASTOLIC BLOOD PRESSURE: 64 MMHG | BODY MASS INDEX: 25.59 KG/M2 | SYSTOLIC BLOOD PRESSURE: 128 MMHG | HEIGHT: 72 IN | WEIGHT: 188.94 LBS

## 2018-10-08 DIAGNOSIS — Z00.00 ANNUAL PHYSICAL EXAM: ICD-10-CM

## 2018-10-08 DIAGNOSIS — E78.5 DYSLIPIDEMIA: ICD-10-CM

## 2018-10-08 DIAGNOSIS — R53.83 FATIGUE, UNSPECIFIED TYPE: ICD-10-CM

## 2018-10-08 DIAGNOSIS — R06.02 SOB (SHORTNESS OF BREATH) ON EXERTION: ICD-10-CM

## 2018-10-08 DIAGNOSIS — Z00.00 ANNUAL PHYSICAL EXAM: Primary | ICD-10-CM

## 2018-10-08 LAB
ALBUMIN SERPL BCP-MCNC: 4 G/DL
ALP SERPL-CCNC: 77 U/L
ALT SERPL W/O P-5'-P-CCNC: 26 U/L
ANION GAP SERPL CALC-SCNC: 7 MMOL/L
AST SERPL-CCNC: 31 U/L
BASOPHILS # BLD AUTO: 0.04 K/UL
BASOPHILS NFR BLD: 0.7 %
BILIRUB SERPL-MCNC: 0.4 MG/DL
BUN SERPL-MCNC: 18 MG/DL
CALCIUM SERPL-MCNC: 9.3 MG/DL
CHLORIDE SERPL-SCNC: 106 MMOL/L
CHOLEST SERPL-MCNC: 194 MG/DL
CHOLEST/HDLC SERPL: 3.5 {RATIO}
CO2 SERPL-SCNC: 27 MMOL/L
CREAT SERPL-MCNC: 0.9 MG/DL
DIFFERENTIAL METHOD: ABNORMAL
EOSINOPHIL # BLD AUTO: 0.4 K/UL
EOSINOPHIL NFR BLD: 6.4 %
ERYTHROCYTE [DISTWIDTH] IN BLOOD BY AUTOMATED COUNT: 12.6 %
EST. GFR  (AFRICAN AMERICAN): >60 ML/MIN/1.73 M^2
EST. GFR  (NON AFRICAN AMERICAN): >60 ML/MIN/1.73 M^2
GLUCOSE SERPL-MCNC: 88 MG/DL
HCT VFR BLD AUTO: 39.5 %
HDLC SERPL-MCNC: 56 MG/DL
HDLC SERPL: 28.9 %
HGB BLD-MCNC: 13.2 G/DL
IMM GRANULOCYTES # BLD AUTO: 0.03 K/UL
IMM GRANULOCYTES NFR BLD AUTO: 0.5 %
LDLC SERPL CALC-MCNC: 120.8 MG/DL
LYMPHOCYTES # BLD AUTO: 1.5 K/UL
LYMPHOCYTES NFR BLD: 26.5 %
MCH RBC QN AUTO: 29.9 PG
MCHC RBC AUTO-ENTMCNC: 33.4 G/DL
MCV RBC AUTO: 90 FL
MONOCYTES # BLD AUTO: 0.5 K/UL
MONOCYTES NFR BLD: 9.3 %
NEUTROPHILS # BLD AUTO: 3.1 K/UL
NEUTROPHILS NFR BLD: 56.6 %
NONHDLC SERPL-MCNC: 138 MG/DL
NRBC BLD-RTO: 0 /100 WBC
PLATELET # BLD AUTO: 153 K/UL
PMV BLD AUTO: 10.8 FL
POTASSIUM SERPL-SCNC: 4.3 MMOL/L
PROT SERPL-MCNC: 6.9 G/DL
RBC # BLD AUTO: 4.41 M/UL
SODIUM SERPL-SCNC: 140 MMOL/L
TRIGL SERPL-MCNC: 86 MG/DL
TSH SERPL DL<=0.005 MIU/L-ACNC: 1.75 UIU/ML
WBC # BLD AUTO: 5.47 K/UL

## 2018-10-08 PROCEDURE — 36415 COLL VENOUS BLD VENIPUNCTURE: CPT | Mod: PO

## 2018-10-08 PROCEDURE — 90714 TD VACC NO PRESV 7 YRS+ IM: CPT | Mod: PBBFAC,PO

## 2018-10-08 PROCEDURE — 99999 PR PBB SHADOW E&M-EST. PATIENT-LVL III: CPT | Mod: PBBFAC,,, | Performed by: INTERNAL MEDICINE

## 2018-10-08 PROCEDURE — 85025 COMPLETE CBC W/AUTO DIFF WBC: CPT

## 2018-10-08 PROCEDURE — 93010 ELECTROCARDIOGRAM REPORT: CPT | Mod: ,,, | Performed by: INTERNAL MEDICINE

## 2018-10-08 PROCEDURE — 93005 ELECTROCARDIOGRAM TRACING: CPT | Mod: PBBFAC,PO | Performed by: INTERNAL MEDICINE

## 2018-10-08 PROCEDURE — 80053 COMPREHEN METABOLIC PANEL: CPT

## 2018-10-08 PROCEDURE — 80061 LIPID PANEL: CPT

## 2018-10-08 PROCEDURE — 84443 ASSAY THYROID STIM HORMONE: CPT

## 2018-10-08 PROCEDURE — 99387 INIT PM E/M NEW PAT 65+ YRS: CPT | Mod: S$PBB,,, | Performed by: INTERNAL MEDICINE

## 2018-10-08 PROCEDURE — 99213 OFFICE O/P EST LOW 20 MIN: CPT | Mod: PBBFAC,PO | Performed by: INTERNAL MEDICINE

## 2018-10-08 NOTE — PROGRESS NOTES
Subjective:       Patient ID: Kvng Redd is a 73 y.o. male.    Chief Complaint: Establish Care and Annual Exam    HPI   73 y.o. Male here for annual exam.     Cholesterol: (needs)  Vaccines: Influenza (2018); Tetanus (2018); Pneumovax (done); Zoster (will get)  Eye exam: 2018  Colonoscopy: 2009  DEXA: declined    Exercise:   Diet: regular    Past Medical History:  No date: Allergy  No date: Angio-edema  No date: BPH (benign prostatic hyperplasia)  5/8/2017: Chronic idiopathic urticaria  Past Surgical History:  No date: ADENOIDECTOMY  No date: HERNIA REPAIR  No date: right shoulder replacement  No date: SINUS SURGERY      Comment:  rhinoplasty  No date: SPINE SURGERY  No date: TONSILLECTOMY  Social History    Socioeconomic History      Marital status:       Spouse name: Not on file      Number of children: Not on file      Years of education: Not on file      Highest education level: Not on file    Social Needs      Financial resource strain: Not on file      Food insecurity - worry: Not on file      Food insecurity - inability: Not on file      Transportation needs - medical: Not on file      Transportation needs - non-medical: Not on file    Occupational History      Hairdresser     Tobacco Use      Smoking status: Former Smoker        Packs/day: 1.00        Years: 34.00        Pack years: 34        Types: Cigarettes      Smokeless tobacco: Former User        Quit date: 1/1/1980    Substance and Sexual Activity      Alcohol use: No      Drug use: No      Sexual activity: Yes        Partners: Female    Review of patient's allergies indicates:  No Known Allergies  Kvng Redd had no medications administered during this visit.    Review of Systems   Constitutional: Negative for activity change, appetite change, chills, diaphoresis, fatigue, fever and unexpected weight change.   HENT: Negative for congestion, mouth sores, postnasal drip, rhinorrhea, sinus pressure, sneezing, sore throat, trouble  swallowing and voice change.    Eyes: Negative for discharge, itching and visual disturbance.   Respiratory: Negative for cough, chest tightness, shortness of breath and wheezing.    Cardiovascular: Negative for chest pain, palpitations and leg swelling.   Gastrointestinal: Negative for abdominal pain, blood in stool, constipation, diarrhea, nausea and vomiting.   Endocrine: Negative for cold intolerance and heat intolerance.   Genitourinary: Negative for difficulty urinating, dysuria, flank pain, hematuria and urgency.   Musculoskeletal: Negative for arthralgias, back pain, myalgias and neck pain.   Skin: Negative for rash and wound.   Allergic/Immunologic: Negative for environmental allergies and food allergies.   Neurological: Negative for dizziness, tremors, seizures, syncope, weakness and headaches.   Hematological: Negative for adenopathy. Does not bruise/bleed easily.   Psychiatric/Behavioral: Negative for confusion, sleep disturbance and suicidal ideas. The patient is not nervous/anxious.        Objective:      Physical Exam   Constitutional: He is oriented to person, place, and time. He appears well-developed and well-nourished. No distress.   HENT:   Head: Normocephalic and atraumatic.   Right Ear: External ear normal.   Left Ear: External ear normal.   Nose: Nose normal.   Mouth/Throat: Oropharynx is clear and moist. No oropharyngeal exudate.   Eyes: Conjunctivae and EOM are normal. Pupils are equal, round, and reactive to light. Right eye exhibits no discharge. Left eye exhibits no discharge. No scleral icterus.   Neck: Normal range of motion. Neck supple. No JVD present. No thyromegaly present.   Cardiovascular: Normal rate, regular rhythm, normal heart sounds and intact distal pulses.   No murmur heard.  Pulmonary/Chest: Effort normal and breath sounds normal. No respiratory distress. He has no wheezes. He has no rales.   Abdominal: Soft. Bowel sounds are normal. He exhibits no distension. There is no  tenderness. There is no guarding.   Musculoskeletal: He exhibits no edema.   Lymphadenopathy:     He has no cervical adenopathy.   Neurological: He is alert and oriented to person, place, and time. No cranial nerve deficit. Coordination normal.   Skin: Skin is warm and dry. No rash noted. He is not diaphoretic. No pallor.   Psychiatric: He has a normal mood and affect. Judgment normal.       Assessment:       1. Annual physical exam    2. SOB (shortness of breath) on exertion    3. Fatigue, unspecified type    4. Dyslipidemia        Plan:    1. Blood work ordered       Vaccines: Influenza (2018); Tetanus (2018); Pneumovax (done); Zoster (will get)       Eye exam: 2018       Colonoscopy: 2009       DEXA: declined   2. EKG/Stress ECHO   3/4. Check Labs   5. F/u in 1 yr

## 2018-10-15 ENCOUNTER — CLINICAL SUPPORT (OUTPATIENT)
Dept: CARDIOLOGY | Facility: CLINIC | Age: 73
End: 2018-10-15
Attending: INTERNAL MEDICINE
Payer: MEDICARE

## 2018-10-15 DIAGNOSIS — R53.83 FATIGUE, UNSPECIFIED TYPE: ICD-10-CM

## 2018-10-15 DIAGNOSIS — E78.5 DYSLIPIDEMIA: ICD-10-CM

## 2018-10-15 DIAGNOSIS — R06.02 SOB (SHORTNESS OF BREATH) ON EXERTION: ICD-10-CM

## 2018-10-15 LAB
ESTIMATED PA SYSTOLIC PRESSURE: 20.64
MITRAL VALVE MOBILITY: NORMAL
RETIRED EF AND QEF - SEE NOTES: 60 (ref 55–65)
TRICUSPID VALVE REGURGITATION: NORMAL

## 2018-10-15 PROCEDURE — 93325 DOPPLER ECHO COLOR FLOW MAPG: CPT | Mod: PBBFAC,PO | Performed by: INTERNAL MEDICINE

## 2018-10-15 PROCEDURE — 93351 STRESS TTE COMPLETE: CPT | Mod: 26,S$PBB,, | Performed by: INTERNAL MEDICINE

## 2018-10-15 PROCEDURE — 93320 DOPPLER ECHO COMPLETE: CPT | Mod: 26,S$PBB,, | Performed by: INTERNAL MEDICINE

## 2018-11-13 ENCOUNTER — TELEPHONE (OUTPATIENT)
Dept: INTERNAL MEDICINE | Facility: CLINIC | Age: 73
End: 2018-11-13

## 2018-11-13 NOTE — TELEPHONE ENCOUNTER
I told wife we need wellness form  From [employer].  Our fax number given so she can get faxed to us.

## 2018-11-13 NOTE — TELEPHONE ENCOUNTER
----- Message from Shikha Gomez sent at 11/13/2018  1:30 PM CST -----  Pt needs the following information for [place] to earn his points for the year. If you can fax this and his wife;s(see previous message for ) I did not get the fax number before I sent her message. It is [place] [phone]    Blood pressure  Glucose  Cholesterol  BMI

## 2018-11-21 ENCOUNTER — TELEPHONE (OUTPATIENT)
Dept: INTERNAL MEDICINE | Facility: CLINIC | Age: 73
End: 2018-11-21

## 2018-11-21 NOTE — TELEPHONE ENCOUNTER
I can't find form anywhere.  Doing scanning today.  I left msg apologizing and ask wife to drop off another copy to me and I will make sure it gets sent off.    Recently had his annual to fill it out.

## 2018-11-21 NOTE — TELEPHONE ENCOUNTER
I don't see form in her faxed box. I called patient.  She says she dropped it off last week.  It is the 365 form.  I told her I will look in cc's to scan stack and call her back today on outcome.

## 2018-11-21 NOTE — TELEPHONE ENCOUNTER
----- Message from Bruna Garcia sent at 11/20/2018  4:05 PM CST -----  Contact: Wife Estefania Home # [phone]  Patient's wife is calling in regards to wanting to know if you have received her husbands [employer] DMI forms? She said that she faxed them over on last week and that she would like a call back please.

## 2018-11-27 NOTE — TELEPHONE ENCOUNTER
Patient dropped off another [employer] go365.    We did fill out and faxed off to [employer] and wife advised mailing her original today.  Copy being sent to scan.

## 2019-01-11 ENCOUNTER — OUTPATIENT CASE MANAGEMENT (OUTPATIENT)
Dept: ADMINISTRATIVE | Facility: OTHER | Age: 74
End: 2019-01-11

## 2019-01-11 NOTE — PROGRESS NOTES
Thank you for the referral.  Patient has been assigned to Lynn Velasquez LMSW for low risk screening for Outpatient Case Management.     Reason for referral: Mbrs hands going numb.  May need to see specialist.  Is confused about being [facility] and what that means in terms of using the [facility] network.    Khanh Leonard  RN, ADN, BA, M.A.C.E.  Care Manager, Telephonic Nurse 2  Health Planning and Support  Clinical Operations    [employer]    P: [phone]  Ext. [phone]  or [phone]  [email or web address]      Please contact [facility] at ext. [phone] with any questions.    Thank you,    Tiff Mercedes, [facility]  Outpatient Care Mgmt.  [phone]

## 2019-01-14 ENCOUNTER — OUTPATIENT CASE MANAGEMENT (OUTPATIENT)
Dept: ADMINISTRATIVE | Facility: OTHER | Age: 74
End: 2019-01-14

## 2019-01-14 NOTE — PROGRESS NOTES
Attempt #:  1  This LMSW attempted to reach patient to provide resource. Patient reports spouse is not available at this time as he is in a meeting. LMSW will attempt again on tomorrow.

## 2019-01-15 ENCOUNTER — OUTPATIENT CASE MANAGEMENT (OUTPATIENT)
Dept: ADMINISTRATIVE | Facility: OTHER | Age: 74
End: 2019-01-15

## 2019-01-15 NOTE — PROGRESS NOTES
2nd attempt.    This LMSW attempted to reach patient/caregiver to provide resource. LMSW unable to leave message no voice recorder.  Letter with contact information was sent via [employer] Mail  to patient/caregiver.  Referral source notified.

## 2019-01-15 NOTE — LETTER
January 15, 2019    Kvng Redd  [address]  [place] [address]             [facility]  [address]  [place] [address] Dear: Kvng Redd     I am writing from the Outpatient Complex Care Management Department at [facility].  I received a referral from [facility]  to contact you or your caregiver regarding any needs you may have. I have attempted to contact you or your cargeiver by phone two times unsuccessfully.  Please contact the Outpatient Complex Care Management Department at [phone] if you would like to discuss your needs.      Sincerely,         Lynn Velasquez LMSW

## 2019-01-18 ENCOUNTER — TELEPHONE (OUTPATIENT)
Dept: INTERNAL MEDICINE | Facility: CLINIC | Age: 74
End: 2019-01-18

## 2019-01-18 ENCOUNTER — OUTPATIENT CASE MANAGEMENT (OUTPATIENT)
Dept: ADMINISTRATIVE | Facility: OTHER | Age: 74
End: 2019-01-18

## 2019-01-18 NOTE — PROGRESS NOTES
LMSW received a return call from patient spouse. Spouse reports patient has concerns with his hands going numb. 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 LMSW sent an in basket message to PCP to please contact patient to offer assistance with concerns.

## 2019-01-18 NOTE — TELEPHONE ENCOUNTER
Left msg at patient home for them to call us back to discuss hand numbness.    Probably needs an appt to see dr gallardo if this is new problem and  is not aware of it.

## 2019-01-18 NOTE — TELEPHONE ENCOUNTER
----- Message from Lynn Velasquez LMSW sent at 1/18/2019  3:28 PM CST -----  This LMSW received a referral on the above patient from [facility] . CASSISW spoke with patient spouse. Spouse reports patient hands are going numb and unsure if patient should make an appointment with a hand specialist. . Dr. Garcia please contact patient to offer assistance with concern.    Thank you,    Lynn Velasquez LMSW

## 2019-01-21 ENCOUNTER — OFFICE VISIT (OUTPATIENT)
Dept: INTERNAL MEDICINE | Facility: CLINIC | Age: 74
End: 2019-01-21
Payer: MEDICARE

## 2019-01-21 ENCOUNTER — HOSPITAL ENCOUNTER (OUTPATIENT)
Dept: RADIOLOGY | Facility: HOSPITAL | Age: 74
Discharge: HOME | End: 2019-01-21
Attending: INTERNAL MEDICINE
Payer: MEDICARE

## 2019-01-21 VITALS
HEART RATE: 94 BPM | BODY MASS INDEX: 25.5 KG/M2 | RESPIRATION RATE: 18 BRPM | TEMPERATURE: 99 F | SYSTOLIC BLOOD PRESSURE: 138 MMHG | WEIGHT: 188.25 LBS | HEIGHT: 72 IN | DIASTOLIC BLOOD PRESSURE: 70 MMHG

## 2019-01-21 DIAGNOSIS — G56.93 NEUROPATHY OF BOTH UPPER EXTREMITIES: ICD-10-CM

## 2019-01-21 DIAGNOSIS — G56.93 NEUROPATHY OF BOTH UPPER EXTREMITIES: Primary | ICD-10-CM

## 2019-01-21 PROCEDURE — 72050 X-RAY EXAM NECK SPINE 4/5VWS: CPT | Mod: 26,HCNC,, | Performed by: RADIOLOGY

## 2019-01-21 PROCEDURE — 1101F PR PT FALLS ASSESS DOC 0-1 FALLS W/OUT INJ PAST YR: ICD-10-PCS | Mod: CPTII,HCNC,S$GLB, | Performed by: INTERNAL MEDICINE

## 2019-01-21 PROCEDURE — 72050 XR CERVICAL SPINE COMPLETE 5 VIEW: ICD-10-PCS | Mod: 26,HCNC,, | Performed by: RADIOLOGY

## 2019-01-21 PROCEDURE — 99213 OFFICE O/P EST LOW 20 MIN: CPT | Mod: HCNC,S$GLB,, | Performed by: INTERNAL MEDICINE

## 2019-01-21 PROCEDURE — 99213 PR OFFICE/OUTPT VISIT, EST, LEVL III, 20-29 MIN: ICD-10-PCS | Mod: HCNC,S$GLB,, | Performed by: INTERNAL MEDICINE

## 2019-01-21 PROCEDURE — 72050 X-RAY EXAM NECK SPINE 4/5VWS: CPT | Mod: TC,HCNC,PO

## 2019-01-21 PROCEDURE — 1101F PT FALLS ASSESS-DOCD LE1/YR: CPT | Mod: CPTII,HCNC,S$GLB, | Performed by: INTERNAL MEDICINE

## 2019-01-21 PROCEDURE — 99999 PR PBB SHADOW E&M-EST. PATIENT-LVL IV: ICD-10-PCS | Mod: PBBFAC,HCNC,, | Performed by: INTERNAL MEDICINE

## 2019-01-21 PROCEDURE — 99999 PR PBB SHADOW E&M-EST. PATIENT-LVL IV: CPT | Mod: PBBFAC,HCNC,, | Performed by: INTERNAL MEDICINE

## 2019-01-21 RX ORDER — SULFAMETHOXAZOLE AND TRIMETHOPRIM 800; 160 MG/1; MG/1
TABLET ORAL
COMMUNITY
Start: 2019-01-10 | End: 2019-03-11

## 2019-01-21 NOTE — PROGRESS NOTES
Subjective:       Patient ID: Kvng Redd is a 73 y.o. male.    Chief Complaint: Numbness (arms & hands- burning hill at night- more discomfort on left side); Chills; and Generalized Body Aches    HPI   Pt here for evaluation of chronic B/L UE(L>R) numbness and tingling starting in the forearm area radiating to his hands/fingers. No weakness, neck pain.   Review of Systems   Constitutional: Negative for activity change, appetite change, chills, diaphoresis, fatigue, fever and unexpected weight change.   HENT: Negative for postnasal drip, rhinorrhea, sinus pressure, sneezing, sore throat, trouble swallowing and voice change.    Respiratory: Negative for cough, shortness of breath and wheezing.    Cardiovascular: Negative for chest pain, palpitations and leg swelling.   Gastrointestinal: Negative for abdominal pain, blood in stool, constipation, diarrhea, nausea and vomiting.   Genitourinary: Negative for dysuria.   Musculoskeletal: Negative for arthralgias and myalgias.   Skin: Negative for rash and wound.   Allergic/Immunologic: Negative for environmental allergies and food allergies.   Neurological: Positive for numbness. Negative for dizziness, tremors, seizures, syncope, facial asymmetry, speech difficulty, weakness, light-headedness and headaches.   Hematological: Negative for adenopathy. Does not bruise/bleed easily.       Objective:      Physical Exam   Constitutional: He is oriented to person, place, and time. He appears well-developed and well-nourished. No distress.   HENT:   Head: Normocephalic and atraumatic.   Eyes: Conjunctivae and EOM are normal. Pupils are equal, round, and reactive to light. Right eye exhibits no discharge. Left eye exhibits no discharge. No scleral icterus.   Neck: Normal range of motion. Neck supple. No JVD present.   Cardiovascular: Normal rate, regular rhythm and normal heart sounds.   No murmur heard.  Pulmonary/Chest: Effort normal and breath sounds normal. No respiratory  distress. He has no wheezes. He has no rales.   Musculoskeletal: He exhibits no edema.   Lymphadenopathy:     He has no cervical adenopathy.   Neurological: He is alert and oriented to person, place, and time.   Skin: Skin is warm and dry. No rash noted. He is not diaphoretic. No pallor.       Assessment:       1. Neuropathy of both upper extremities        Plan:    1. X-ray cervical spine        Referral to Neuro for testing

## 2019-01-22 ENCOUNTER — TELEPHONE (OUTPATIENT)
Dept: INTERNAL MEDICINE | Facility: CLINIC | Age: 74
End: 2019-01-22

## 2019-01-22 NOTE — TELEPHONE ENCOUNTER
----- Message from Elio Garcia DO sent at 1/22/2019  6:59 AM CST -----  Mild arthritis in the neck but no significant findings to explain numbness in the arms

## 2019-03-08 ENCOUNTER — PATIENT MESSAGE (OUTPATIENT)
Dept: NEUROLOGY | Facility: CLINIC | Age: 74
End: 2019-03-08

## 2019-03-11 ENCOUNTER — OFFICE VISIT (OUTPATIENT)
Dept: NEUROLOGY | Facility: CLINIC | Age: 74
End: 2019-03-11
Payer: MEDICARE

## 2019-03-11 VITALS
SYSTOLIC BLOOD PRESSURE: 110 MMHG | DIASTOLIC BLOOD PRESSURE: 78 MMHG | WEIGHT: 186.31 LBS | HEIGHT: 72 IN | HEART RATE: 70 BPM | BODY MASS INDEX: 25.24 KG/M2

## 2019-03-11 DIAGNOSIS — R20.2 NUMBNESS AND TINGLING OF UPPER EXTREMITY: Primary | ICD-10-CM

## 2019-03-11 DIAGNOSIS — R20.0 NUMBNESS AND TINGLING OF UPPER EXTREMITY: Primary | ICD-10-CM

## 2019-03-11 DIAGNOSIS — G56.03 BILATERAL CARPAL TUNNEL SYNDROME: ICD-10-CM

## 2019-03-11 PROCEDURE — 99999 PR PBB SHADOW E&M-EST. PATIENT-LVL III: CPT | Mod: PBBFAC,HCNC,, | Performed by: PSYCHIATRY & NEUROLOGY

## 2019-03-11 PROCEDURE — 99204 PR OFFICE/OUTPT VISIT, NEW, LEVL IV, 45-59 MIN: ICD-10-PCS | Mod: HCNC,S$GLB,, | Performed by: PSYCHIATRY & NEUROLOGY

## 2019-03-11 PROCEDURE — 1101F PT FALLS ASSESS-DOCD LE1/YR: CPT | Mod: HCNC,CPTII,S$GLB, | Performed by: PSYCHIATRY & NEUROLOGY

## 2019-03-11 PROCEDURE — 1101F PR PT FALLS ASSESS DOC 0-1 FALLS W/OUT INJ PAST YR: ICD-10-PCS | Mod: HCNC,CPTII,S$GLB, | Performed by: PSYCHIATRY & NEUROLOGY

## 2019-03-11 PROCEDURE — 99999 PR PBB SHADOW E&M-EST. PATIENT-LVL III: ICD-10-PCS | Mod: PBBFAC,HCNC,, | Performed by: PSYCHIATRY & NEUROLOGY

## 2019-03-11 PROCEDURE — 99204 OFFICE O/P NEW MOD 45 MIN: CPT | Mod: HCNC,S$GLB,, | Performed by: PSYCHIATRY & NEUROLOGY

## 2019-03-11 NOTE — PATIENT INSTRUCTIONS
Discussed with patient and spouse. Differential diagnosis would include the possibility of bilateral carpal tunnel syndrome versus brachial plexitis/cervical radiculopathy.  EMG/NCS bilateral upper extremities.  Will contact patient with results and further recommendations regarding management and follow-up.

## 2019-03-11 NOTE — PROGRESS NOTES
Subjective:       Patient ID: Kvng Redd is a 73 y.o. male.    Chief Complaint:  Numbness      History of Present Illness  HPI   This is a 73-year-old male was referred for evaluation of chronic intermittent numbness and tingling with occasional pain starting in the forearm area, radiating to his hands/fingers, primarily affecting the digits innervated by the median nerve.  The patient has symptoms going on for at least 3-4 years however in the 6 months they have been present daily.  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  The symptoms usually come after work and occasionally he has noted it during his primarily affecting the greater than the right.  In addition, recently the symptoms been awakening him at night.  The patient has no history of diabetes.  He denies any history of recent trauma.  No bowel or bladder.  No symptoms in the lower extremities. He has had a history of chronic back problems past however these had improved after lumbar spine surgeries done at [facility] many years ago.    X-rays of his cervical spine done by his primary care physician revealed mild degenerative joint disease with mild narrowing of the disc spaces between C5 and C7 vertebral segments.  No encroachment of the neural foramina identified on either side.  No fracture or dislocation.  No bone destruction identified.  Calcification in the area of the carotid bifurcations noted bilaterally.       Review of Systems  Review of Systems   Constitutional: Negative.    HENT: Negative.  Negative for hearing loss.    Eyes: Negative.  Negative for visual disturbance.   Respiratory: Negative.  Negative for shortness of breath.    Cardiovascular: Negative.  Negative for chest pain and palpitations.   Gastrointestinal: Negative.    Endocrine: Negative.    Genitourinary: Negative.    Musculoskeletal: Positive for back pain. Negative for gait problem.   Skin: Negative.     Allergic/Immunologic: Negative.    Neurological: Positive for numbness. Negative for dizziness, tremors, seizures, syncope, speech difficulty, weakness and headaches.   Hematological: Negative.    Psychiatric/Behavioral: Negative for decreased concentration and sleep disturbance.       Objective:      Neurologic Exam     Mental Status   Oriented to person, place, and time.   Registration: recalls 3 of 3 objects. Follows 3 step commands.   Attention: normal. Concentration: normal.   Speech: speech is normal   Level of consciousness: alert  Knowledge: good.   Able to name object. Able to read. Able to repeat. Able to write. Normal comprehension.   Patient is alert, verbal and coherent and in no distress.  He is able to give an adequate recent and past medical history.  Affect is appropriate and mood is even.     Cranial Nerves   Cranial nerves II through XII intact.     CN II   Visual fields full to confrontation.     CN III, IV, VI   Pupils are equal, round, and reactive to light.  Extraocular motions are normal.   Right pupil: Size: 3 mm. Shape: regular. Reactivity: brisk. Consensual response: intact. Accommodation: intact.   Left pupil: Size: 3 mm. Shape: regular. Reactivity: brisk. Consensual response: intact. Accommodation: intact.   CN III: no CN III palsy  CN VI: no CN VI palsy  Nystagmus: none   Diplopia: none  Ophthalmoparesis: none    CN V   Facial sensation intact.     CN VII   Facial expression full, symmetric.     CN VIII   CN VIII normal.     CN IX, X   CN IX normal.     CN XI   CN XI normal.     CN XII   CN XII normal.   Fundus examination:  Sharp disc margins.  Normal vessels on nondilated exam.     Motor Exam   Muscle bulk: normal  Overall muscle tone: normal    Strength   Strength 5/5 except as noted. Weakness noted in APB muscles bilaterally but much more prominent on the left.  No pronator drift noted.     Sensory Exam   Light touch normal.   Vibration normal.   Proprioception normal.   Pinprick  normal.   Tinel's sign positive left wrist.  Phalen's positive bilaterally, left greater than right     Gait, Coordination, and Reflexes     Gait  Gait: normal    Coordination   Romberg: negative  Finger to nose coordination: normal    Tremor   Resting tremor: absent  Intention tremor: absent  Action tremor: absent    Reflexes   Right brachioradialis: 1+  Left brachioradialis: 1+  Right biceps: 1+  Left biceps: 1+  Right triceps: 1+  Left triceps: 1+  Right patellar: 1+  Left patellar: 1+  Right achilles: 1+  Left achilles: 1+  Right plantar: normal  Left plantar: normal      Physical Exam   Constitutional: He is oriented to person, place, and time. He appears well-developed and well-nourished.   HENT:   Head: Normocephalic and atraumatic.   Eyes: EOM are normal. Pupils are equal, round, and reactive to light.   Neck: Normal range of motion. Neck supple. Carotid bruit is not present.   Cardiovascular: Normal rate and regular rhythm.   Neurological: He is oriented to person, place, and time. He has a normal Finger-Nose-Finger Test and a normal Romberg Test. Gait normal.   Reflex Scores:       Tricep reflexes are 1+ on the right side and 1+ on the left side.       Bicep reflexes are 1+ on the right side and 1+ on the left side.       Brachioradialis reflexes are 1+ on the right side and 1+ on the left side.       Patellar reflexes are 1+ on the right side and 1+ on the left side.       Achilles reflexes are 1+ on the right side and 1+ on the left side.  Psychiatric: His speech is normal.   Vitals reviewed.        Assessment:        1. Numbness and tingling of upper extremity            Plan:       Discussed with patient and spouse. Differential diagnosis would include the possibility of bilateral carpal tunnel syndrome versus brachial plexitis/cervical radiculopathy.  EMG/NCS bilateral upper extremities.  Will contact patient with results and further recommendations regarding management and follow-up.

## 2019-04-18 ENCOUNTER — OUTPATIENT CASE MANAGEMENT (OUTPATIENT)
Dept: ADMINISTRATIVE | Facility: OTHER | Age: 74
End: 2019-04-18

## 2019-04-18 NOTE — PROGRESS NOTES
LMSW received a referral from [facility] to assist with care coordination. LMSW attempt to reach patient spouse per referral no answer. LMSW left a detail message for a return call.

## 2019-04-24 NOTE — PROGRESS NOTES
"This SW received a referral on the above patient.   Reason for referral:Member needs assistance with care coordination.  You can reach his wife Estefania [phone]      LMSW contacted patient spouse as indicated by referral. Spouse reports she contacted [facility] to verify if there is another neurology in the [facility] she may see. Spouse reports the process to see a neurologist is too long. Spouse reports her  hands have been numb since January. Spouse reports PCP recommended patient see a neurologist. Spouse reports neurologist ordered  additioanl testing. Per Chart review Neurologist referred patient to get " A EMG/NCS bilateral upper extremities.  Will contact patient with results and further recommendations regarding management and follow-up." Spouse reports this is unacceptable as patient has been going through this for approximately five months. Spouse reports all care is coordinated, but not happy with the process. Spouse reports not additional needs at this time. LMSW will close case,.         "

## 2019-05-17 ENCOUNTER — OFFICE VISIT (OUTPATIENT)
Dept: ALLERGY | Facility: CLINIC | Age: 74
End: 2019-05-17
Payer: MEDICARE

## 2019-05-17 VITALS — HEIGHT: 72 IN | WEIGHT: 185.88 LBS | BODY MASS INDEX: 25.18 KG/M2 | HEART RATE: 88 BPM | OXYGEN SATURATION: 98 %

## 2019-05-17 DIAGNOSIS — T78.3XXD ANGIOEDEMA, SUBSEQUENT ENCOUNTER: Primary | ICD-10-CM

## 2019-05-17 DIAGNOSIS — J30.89 ALLERGIC RHINITIS DUE TO DUST MITE: ICD-10-CM

## 2019-05-17 DIAGNOSIS — J30.9 CHRONIC ALLERGIC RHINITIS: ICD-10-CM

## 2019-05-17 PROCEDURE — 99214 OFFICE O/P EST MOD 30 MIN: CPT | Mod: HCNC,S$GLB,, | Performed by: ALLERGY & IMMUNOLOGY

## 2019-05-17 PROCEDURE — 1101F PT FALLS ASSESS-DOCD LE1/YR: CPT | Mod: HCNC,CPTII,S$GLB, | Performed by: ALLERGY & IMMUNOLOGY

## 2019-05-17 PROCEDURE — 99999 PR PBB SHADOW E&M-EST. PATIENT-LVL III: ICD-10-PCS | Mod: PBBFAC,HCNC,, | Performed by: ALLERGY & IMMUNOLOGY

## 2019-05-17 PROCEDURE — 1101F PR PT FALLS ASSESS DOC 0-1 FALLS W/OUT INJ PAST YR: ICD-10-PCS | Mod: HCNC,CPTII,S$GLB, | Performed by: ALLERGY & IMMUNOLOGY

## 2019-05-17 PROCEDURE — 99999 PR PBB SHADOW E&M-EST. PATIENT-LVL III: CPT | Mod: PBBFAC,HCNC,, | Performed by: ALLERGY & IMMUNOLOGY

## 2019-05-17 PROCEDURE — 99214 PR OFFICE/OUTPT VISIT, EST, LEVL IV, 30-39 MIN: ICD-10-PCS | Mod: HCNC,S$GLB,, | Performed by: ALLERGY & IMMUNOLOGY

## 2019-05-17 RX ORDER — MONTELUKAST SODIUM 10 MG/1
10 TABLET ORAL NIGHTLY
Qty: 90 TABLET | Refills: 4 | Status: SHIPPED | OUTPATIENT
Start: 2019-05-17 | End: 2020-06-25

## 2019-05-17 NOTE — PROGRESS NOTES
Chief Complaint: Allergies (fm[facility] Dr Garsia pt, refills)    Patient is known to me. The last visit was 11/9/2017  Patient is accompanied: No  Diagnosis at previous visit:  1. Angioedema: Active treatment; symptoms quiescent      2. Chronic idiopathic urticaria: Quiescent      3. Allergic rhinitis due to house dust mite: Quiescent on medication      4. Generalized pruritus: Quiescent on medication         Subjective:         HPI    Kvng Redd is a 73 y.o. male here for continued evaluation of angioedema, allergic rhinitis and prior history of hives. He was last seen 5/2/18 by Dr Garsia. He states this started in 2016. He saw [facility] and had albs and all normal. Then had skin test inhalants positive to dust mites and skin test foods all negative. 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 He takes montelukast 10 mg and cetirizine 20 mg every night. Th is keeps it at bay. He occ feels tingling in lips like swelling is coming but never big. Prior to meds had frequent lip, eye and facial swelling. He denies hives but was reported in prior notes. He has not missed meds and is well controlled. He has no rhinitis on this regimen, no congestion, runny nose or sneeze. No cough SOB or wheeze.          Review of Systems  Constitutional: Negative for changes in appetite, unintentional weight loss, fever, chills and fatigue.   HENT: Negative for facial pain, nose bleeds, nasal congestion, postnasal drip, throat clearing, sinus pressure, and voice change. Negative for ear discharge, ear pain, facial swelling, sore throat and trouble swallowing.  Eyes: Negative for occular discharge, redness, itching and visual disturbance.  Respiratory: Negative for chest tightness, shortness of breath, wheezing, dyspnea on exertion, sputum production and cough.   Cardiovascular: Negative for chest pain, palpitations and leg swelling.  Gastrointestinal: Negative for abdominal distension, abdominal pain, constipation, diarrhea, nausea,and vomiting.    Genitourinary: Negative for difficulty urinating.   Musculoskeletal: Negative for arthralgias, gait problems, joint swelling, myalgias and back pain.   Neurological: Negative for dizziness, syncope, weakness, light-headedness, and headaches.   Hematological: Negative for adenopathy, does not bruise or bleed easily.  Psychiatric/Behavioral: Negative for agitation, anxiety, behavioral problems, confusion, and insomnia.  Skin: Positive for angioedema as above.     PMH:  Reviewed with the patient and current for this visit    Family History:  Reviewed with the patient and current for this visit    Social History:  Reviewed with the patient and current for this visit     Environmental History:  Reviewed with the patient and current for this visit          Objective:      Skin Test results: Positive to house dust mites; test was prick skin test done on 4/28/2016.  Skin test for foods done on the same day was negative.The positive histamine control (3+) and the negative saline control (0), indicate that this was a valid assessment of allergic recognition.     Immunotherapy: Patient has not been on allergen specific immunotherapy     Physical Exam  General:patient is well developed and well nourished, in no acute distress.  Mental Status:  Alert, oriented and cooperative  Head and Face: normocephalic   Allergic shiners: No  Eyes:   Pupils: ERRLA: Scleral conjunctiva: clear; Cornea: clear; Palprebal conjunctiva: normal: Eyelid Skin: normal  Ears:Tympanic membrane Left:intact and normal light reflex; Right:intact and normal light reflex; External canals normal bilaterally.  Nose:  Nares: patent; Mucosa :pink and moist; Nasal septum: midline;Turbinates are of normal size bilaterally and do not occlude the nasal airway.  Mouth/Pharynx: tonsils present; posterior pharyngeal wall normal; tongue normal; teeth normal; voice quality normal.  Neck:  Cervical lymph nodes: small, non-tender, freely moveable both anterior and  posterior cervical chain; Trachea: midline; Masses: none  Lungs: Air movement is good; respiratory effort is good; no respiratory distress; breath sounds are vesicular in all lung fields; no wheezing; normal expiratory time; no cough.  Heart: regular rate and rhythm with mild respiratory variation; A1 and P2 are normal; no murmurs or gallops.  Abdomen:exam not done  Extremities: no cyanosis, clubbing or edema  Skin:no rashes or lesions present; skin hydrated and supple.         Assessment:       1. Angioedema, subsequent encounter     2. Chronic allergic rhinitis     3. Allergic rhinitis due to dust mite             Plan:       1. Continue montelukast 10 mg nightly  2. continue cetirizine 20 mg nightly. As doing well could consider decrease to 10 mg  3. benadryl as needed if flares  4. RTC annually or sooner if needed      There are no Patient Instructions on file for this visit.  25 minutes spent face to face with this patient. 50% of time spent counseling this patient about the medical conditions (pathophysiology), the options and strategies for treatments, and the risks and benefits of treatments.    Patient education content included: safety of medication long-term and the addition of Claritin or Allegra as needed.             Ya Bettencourt MD

## 2019-05-23 ENCOUNTER — PROCEDURE VISIT (OUTPATIENT)
Dept: NEUROLOGY | Facility: CLINIC | Age: 74
End: 2019-05-23
Payer: MEDICARE

## 2019-05-23 DIAGNOSIS — R20.0 NUMBNESS AND TINGLING OF UPPER EXTREMITY: ICD-10-CM

## 2019-05-23 DIAGNOSIS — R20.2 NUMBNESS AND TINGLING OF UPPER EXTREMITY: ICD-10-CM

## 2019-05-23 DIAGNOSIS — G56.03 BILATERAL CARPAL TUNNEL SYNDROME: ICD-10-CM

## 2019-05-23 PROCEDURE — 95886 MUSC TEST DONE W/N TEST COMP: CPT | Mod: HCNC,S$GLB,, | Performed by: NEUROMUSCULOSKELETAL MEDICINE & OMM

## 2019-05-23 PROCEDURE — 95886 PR EMG COMPLETE, W/ NERVE CONDUCTION STUDIES, 5+ MUSCLES: ICD-10-PCS | Mod: HCNC,S$GLB,, | Performed by: NEUROMUSCULOSKELETAL MEDICINE & OMM

## 2019-05-23 PROCEDURE — 95910 PR NERVE CONDUCTION STUDY; 7-8 STUDIES: ICD-10-PCS | Mod: HCNC,S$GLB,, | Performed by: NEUROMUSCULOSKELETAL MEDICINE & OMM

## 2019-05-23 PROCEDURE — 95910 NRV CNDJ TEST 7-8 STUDIES: CPT | Mod: HCNC,S$GLB,, | Performed by: NEUROMUSCULOSKELETAL MEDICINE & OMM

## 2019-05-29 ENCOUNTER — TELEPHONE (OUTPATIENT)
Dept: NEUROLOGY | Facility: CLINIC | Age: 74
End: 2019-05-29

## 2019-05-29 NOTE — TELEPHONE ENCOUNTER
----- Message from Funmilayo Holder sent at 5/29/2019  9:22 AM CDT -----  Contact: pt   Name of Who is Calling: EMMETT HARRIS [[id number]]    What is the request in detail: is requesting call back in regards to checking results from Dr. Isaacs... Please contact to further discuss and advise      Can the clinic reply by [facility]: No   What Number to Call Back if not in [facility]: [phone].

## 2019-05-30 ENCOUNTER — TELEPHONE (OUTPATIENT)
Dept: NEUROLOGY | Facility: CLINIC | Age: 74
End: 2019-05-30

## 2019-05-30 DIAGNOSIS — G56.03 BILATERAL CARPAL TUNNEL SYNDROME: Primary | ICD-10-CM

## 2019-05-30 NOTE — TELEPHONE ENCOUNTER
LM with scheduled appointment for patient to be seen on 6-3 at 0830 in hand clinic here at [facility]. To contact our office with any further questions.

## 2019-05-30 NOTE — TELEPHONE ENCOUNTER
----- Message from Deloris Aldrich sent at 5/30/2019  9:06 AM CDT -----  Contact: Estefania (wife) @ [phone]  patient's wife phoned, says she spoke with Jodee earlier regarding the patient's results from [facility] (5/23), I just talked to Kayla in Dr. Isaacs's office and she says the results are in the patient's chart, says she was just looking at them; and it's Dr. Layton that should be giving the results to the patient. Please call patient.

## 2019-06-28 ENCOUNTER — PATIENT OUTREACH (OUTPATIENT)
Dept: ADMINISTRATIVE | Facility: HOSPITAL | Age: 74
End: 2019-06-28

## 2019-07-30 ENCOUNTER — TELEPHONE (OUTPATIENT)
Dept: INTERNAL MEDICINE | Facility: CLINIC | Age: 74
End: 2019-07-30

## 2019-07-30 DIAGNOSIS — R53.83 FATIGUE, UNSPECIFIED TYPE: ICD-10-CM

## 2019-07-30 DIAGNOSIS — E78.5 DYSLIPIDEMIA: Primary | ICD-10-CM

## 2019-07-30 DIAGNOSIS — Z12.5 PROSTATE CANCER SCREENING: ICD-10-CM

## 2019-07-30 NOTE — TELEPHONE ENCOUNTER
----- Message from Laverne Rocha sent at 7/30/2019  3:43 PM CDT -----  Contact: wife/ [phone]  Doctor appointment and lab have been scheduled.  Please link lab orders to the lab appointment.  Date of doctor appointment:  10/14  Date of lab appointment:  10/7  Physical or EP: phys  Comments:

## 2019-10-07 ENCOUNTER — LAB VISIT (OUTPATIENT)
Dept: LAB | Facility: HOSPITAL | Age: 74
End: 2019-10-07
Attending: INTERNAL MEDICINE
Payer: MEDICARE

## 2019-10-07 DIAGNOSIS — R53.83 FATIGUE, UNSPECIFIED TYPE: ICD-10-CM

## 2019-10-07 DIAGNOSIS — Z12.5 PROSTATE CANCER SCREENING: ICD-10-CM

## 2019-10-07 DIAGNOSIS — E78.5 DYSLIPIDEMIA: ICD-10-CM

## 2019-10-07 LAB
ALBUMIN SERPL BCP-MCNC: 4.4 G/DL (ref 3.5–5.2)
ALP SERPL-CCNC: 82 U/L (ref 55–135)
ALT SERPL W/O P-5'-P-CCNC: 26 U/L (ref 10–44)
ANION GAP SERPL CALC-SCNC: 8 MMOL/L (ref 8–16)
AST SERPL-CCNC: 25 U/L (ref 10–40)
BASOPHILS # BLD AUTO: 0.04 K/UL (ref 0–0.2)
BASOPHILS NFR BLD: 0.6 % (ref 0–1.9)
BILIRUB SERPL-MCNC: 0.6 MG/DL (ref 0.1–1)
BUN SERPL-MCNC: 23 MG/DL (ref 8–23)
CALCIUM SERPL-MCNC: 9.9 MG/DL (ref 8.7–10.5)
CHLORIDE SERPL-SCNC: 100 MMOL/L (ref 95–110)
CHOLEST SERPL-MCNC: 185 MG/DL (ref 120–199)
CHOLEST/HDLC SERPL: 3 {RATIO} (ref 2–5)
CO2 SERPL-SCNC: 27 MMOL/L (ref 23–29)
COMPLEXED PSA SERPL-MCNC: 1.9 NG/ML (ref 0–4)
CREAT SERPL-MCNC: 0.9 MG/DL (ref 0.5–1.4)
DIFFERENTIAL METHOD: ABNORMAL
EOSINOPHIL # BLD AUTO: 0.2 K/UL (ref 0–0.5)
EOSINOPHIL NFR BLD: 2.7 % (ref 0–8)
ERYTHROCYTE [DISTWIDTH] IN BLOOD BY AUTOMATED COUNT: 13.1 % (ref 11.5–14.5)
EST. GFR  (AFRICAN AMERICAN): >60 ML/MIN/1.73 M^2
EST. GFR  (NON AFRICAN AMERICAN): >60 ML/MIN/1.73 M^2
GLUCOSE SERPL-MCNC: 90 MG/DL (ref 70–110)
HCT VFR BLD AUTO: 42.6 % (ref 40–54)
HDLC SERPL-MCNC: 62 MG/DL (ref 40–75)
HDLC SERPL: 33.5 % (ref 20–50)
HGB BLD-MCNC: 13.6 G/DL (ref 14–18)
IMM GRANULOCYTES # BLD AUTO: 0.03 K/UL (ref 0–0.04)
IMM GRANULOCYTES NFR BLD AUTO: 0.4 % (ref 0–0.5)
LDLC SERPL CALC-MCNC: 108.8 MG/DL (ref 63–159)
LYMPHOCYTES # BLD AUTO: 1.9 K/UL (ref 1–4.8)
LYMPHOCYTES NFR BLD: 26.4 % (ref 18–48)
MCH RBC QN AUTO: 29.4 PG (ref 27–31)
MCHC RBC AUTO-ENTMCNC: 31.9 G/DL (ref 32–36)
MCV RBC AUTO: 92 FL (ref 82–98)
MONOCYTES # BLD AUTO: 0.7 K/UL (ref 0.3–1)
MONOCYTES NFR BLD: 9.7 % (ref 4–15)
NEUTROPHILS # BLD AUTO: 4.3 K/UL (ref 1.8–7.7)
NEUTROPHILS NFR BLD: 60.2 % (ref 38–73)
NONHDLC SERPL-MCNC: 123 MG/DL
NRBC BLD-RTO: 0 /100 WBC
PLATELET # BLD AUTO: 209 K/UL (ref 150–350)
PMV BLD AUTO: 10.3 FL (ref 9.2–12.9)
POTASSIUM SERPL-SCNC: 4.2 MMOL/L (ref 3.5–5.1)
PROT SERPL-MCNC: 7.6 G/DL (ref 6–8.4)
RBC # BLD AUTO: 4.62 M/UL (ref 4.6–6.2)
SODIUM SERPL-SCNC: 135 MMOL/L (ref 136–145)
TRIGL SERPL-MCNC: 71 MG/DL (ref 30–150)
TSH SERPL DL<=0.005 MIU/L-ACNC: 1.73 UIU/ML (ref 0.4–4)
WBC # BLD AUTO: 7.12 K/UL (ref 3.9–12.7)

## 2019-10-07 PROCEDURE — 36415 COLL VENOUS BLD VENIPUNCTURE: CPT | Mod: HCNC,PO

## 2019-10-07 PROCEDURE — 84153 ASSAY OF PSA TOTAL: CPT | Mod: HCNC

## 2019-10-07 PROCEDURE — 80061 LIPID PANEL: CPT | Mod: HCNC

## 2019-10-07 PROCEDURE — 84443 ASSAY THYROID STIM HORMONE: CPT | Mod: HCNC

## 2019-10-07 PROCEDURE — 80053 COMPREHEN METABOLIC PANEL: CPT | Mod: HCNC

## 2019-10-07 PROCEDURE — 85025 COMPLETE CBC W/AUTO DIFF WBC: CPT | Mod: HCNC

## 2019-10-14 ENCOUNTER — OFFICE VISIT (OUTPATIENT)
Dept: INTERNAL MEDICINE | Facility: CLINIC | Age: 74
End: 2019-10-14
Payer: MEDICARE

## 2019-10-14 VITALS
HEIGHT: 71 IN | SYSTOLIC BLOOD PRESSURE: 113 MMHG | WEIGHT: 178.38 LBS | BODY MASS INDEX: 24.97 KG/M2 | TEMPERATURE: 98 F | DIASTOLIC BLOOD PRESSURE: 61 MMHG | HEART RATE: 80 BPM | RESPIRATION RATE: 16 BRPM

## 2019-10-14 DIAGNOSIS — Z87.891 HISTORY OF TOBACCO ABUSE: ICD-10-CM

## 2019-10-14 DIAGNOSIS — Z00.00 ANNUAL PHYSICAL EXAM: Primary | ICD-10-CM

## 2019-10-14 DIAGNOSIS — N40.0 BENIGN PROSTATIC HYPERPLASIA, UNSPECIFIED WHETHER LOWER URINARY TRACT SYMPTOMS PRESENT: ICD-10-CM

## 2019-10-14 PROCEDURE — 99999 PR PBB SHADOW E&M-EST. PATIENT-LVL III: ICD-10-PCS | Mod: PBBFAC,HCNC,, | Performed by: INTERNAL MEDICINE

## 2019-10-14 PROCEDURE — 99397 PER PM REEVAL EST PAT 65+ YR: CPT | Mod: HCNC,S$GLB,, | Performed by: INTERNAL MEDICINE

## 2019-10-14 PROCEDURE — 99397 PR PREVENTIVE VISIT,EST,65 & OVER: ICD-10-PCS | Mod: HCNC,S$GLB,, | Performed by: INTERNAL MEDICINE

## 2019-10-14 PROCEDURE — 99999 PR PBB SHADOW E&M-EST. PATIENT-LVL III: CPT | Mod: PBBFAC,HCNC,, | Performed by: INTERNAL MEDICINE

## 2019-10-14 NOTE — PROGRESS NOTES
Subjective:       Patient ID: Kvng Redd is a 74 y.o. male.    Chief Complaint: Annual Exam    HPI   74 y.o. Male here for annual exam.      Vaccines: Influenza (2019); Tetanus (2018); Pneumovax (done); Shingrix (2019)  Eye exam: 2018  Colonoscopy: 6/19  DEXA: declined     Exercise:   Diet: regular     Past Medical History:  No date: Allergy  No date: Angio-edema  No date: BPH (benign prostatic hyperplasia)  5/8/2017: Chronic idiopathic urticaria  Past Surgical History:  No date: ADENOIDECTOMY  No date: HERNIA REPAIR  No date: right shoulder replacement  No date: SINUS SURGERY      Comment:  rhinoplasty  No date: SPINE SURGERY  No date: TONSILLECTOMY  Social History    Socioeconomic History      Marital status:       Spouse name: Not on file      Number of children: Not on file      Years of education: Not on file      Highest education level: Not on file    Social Needs      Financial resource strain: Not on file      Food insecurity - worry: Not on file      Food insecurity - inability: Not on file      Transportation needs - medical: Not on file      Transportation needs - non-medical: Not on file    Occupational History      Hairdresser     Tobacco Use      Smoking status: Former Smoker        Packs/day: 1.00        Years: 34.00        Pack years: 34        Types: Cigarettes      Smokeless tobacco: Former User        Quit date: 1/1/1980    Substance and Sexual Activity      Alcohol use: No      Drug use: No      Sexual activity: Yes        Partners: Female     Review of patient's allergies indicates:  No Known Allergies  Review of Systems   Constitutional: Negative for activity change, appetite change, chills, diaphoresis, fatigue, fever and unexpected weight change.   HENT: Negative for congestion, hearing loss, mouth sores, postnasal drip, rhinorrhea, sinus pressure, sneezing, sore throat, trouble swallowing and voice change.    Eyes: Negative for discharge, itching and visual disturbance.    Respiratory: Negative for cough, chest tightness, shortness of breath and wheezing.    Cardiovascular: Negative for chest pain, palpitations and leg swelling.   Gastrointestinal: Negative for abdominal pain, blood in stool, constipation, diarrhea, nausea and vomiting.   Endocrine: Negative for cold intolerance, heat intolerance, polydipsia and polyuria.   Genitourinary: Negative for difficulty urinating, dysuria, flank pain, hematuria and urgency.   Musculoskeletal: Negative for arthralgias, back pain, joint swelling, myalgias and neck pain.   Skin: Negative for rash and wound.   Allergic/Immunologic: Negative for environmental allergies and food allergies.   Neurological: Negative for dizziness, tremors, seizures, syncope, weakness and headaches.   Hematological: Negative for adenopathy. Does not bruise/bleed easily.   Psychiatric/Behavioral: Negative for confusion, dysphoric mood, sleep disturbance and suicidal ideas. The patient is not nervous/anxious.        Objective:      Physical Exam   Constitutional: He is oriented to person, place, and time. He appears well-developed and well-nourished. No distress.   HENT:   Head: Normocephalic and atraumatic.   Right Ear: External ear normal.   Left Ear: External ear normal.   Nose: Nose normal.   Mouth/Throat: Oropharynx is clear and moist. No oropharyngeal exudate.   Eyes: Pupils are equal, round, and reactive to light. Conjunctivae and EOM are normal. Right eye exhibits no discharge. Left eye exhibits no discharge. No scleral icterus.   Neck: Normal range of motion. Neck supple. No JVD present. No thyromegaly present.   Cardiovascular: Normal rate, regular rhythm, normal heart sounds and intact distal pulses.   No murmur heard.  Pulmonary/Chest: Effort normal and breath sounds normal. No respiratory distress. He has no wheezes. He has no rales.   Abdominal: Soft. Bowel sounds are normal. He exhibits no distension. There is no tenderness. There is no guarding.    Musculoskeletal: He exhibits no edema.   Lymphadenopathy:     He has no cervical adenopathy.   Neurological: He is alert and oriented to person, place, and time. No cranial nerve deficit. Coordination normal.   Skin: Skin is warm and dry. No rash noted. He is not diaphoretic. No pallor.   Psychiatric: He has a normal mood and affect. Judgment normal.       Assessment:       1. Annual physical exam    2. Benign prostatic hyperplasia, unspecified whether lower urinary tract symptoms present    3. History of tobacco abuse        Plan:    1. Blood work reviewed with pt       Vaccines: Influenza (2019); Tetanus (2018); Pneumovax (done); Shingrix (2019)       Eye exam: 2018       Colonoscopy: 6/19       DEXA: declined   2. BPH- stable on Cardura   3. Screen for AAA   4. F/u in 1 yr

## 2019-10-21 ENCOUNTER — CLINICAL SUPPORT (OUTPATIENT)
Dept: CARDIOLOGY | Facility: CLINIC | Age: 74
End: 2019-10-21
Attending: INTERNAL MEDICINE
Payer: MEDICARE

## 2019-10-21 DIAGNOSIS — Z87.891 HISTORY OF TOBACCO ABUSE: ICD-10-CM

## 2019-10-21 LAB
ABDOMINAL IMA AP: 1.39 CM
ABDOMINAL IMA ED VEL: 7 CM/S
ABDOMINAL IMA PS VEL: 162 CM/S
ABDOMINAL IMA TRANS: 1.6 CM
ABDOMINAL INFRARENAL AORTA AP: 1.46 CM
ABDOMINAL INFRARENAL AORTA ED VEL: 0 CM/S
ABDOMINAL INFRARENAL AORTA PS VEL: 88 CM/S
ABDOMINAL INFRARENAL AORTA TRANS: 1.53 CM
ABDOMINAL JUXTARENAL AORTA AP: 1.63 CM
ABDOMINAL JUXTARENAL AORTA ED VEL: 13 CM/S
ABDOMINAL JUXTARENAL AORTA PS VEL: 59 CM/S
ABDOMINAL JUXTARENAL AORTA TRANS: 2.03 CM
ABDOMINAL LT COM ILIAC AP: 0.73 CM
ABDOMINAL LT COM ILIAC TRANS: 0.68 CM
ABDOMINAL LT COM ILIAC VEL: 96 CM/S
ABDOMINAL LT COM ILLIAC ED VEL: 13 CM/S
ABDOMINAL RT COM ILIAC AP: 0.67 CM
ABDOMINAL RT COM ILIAC TRANS: 0.89 CM
ABDOMINAL RT COM ILIAC VEL: 190 CM/S
ABDOMINAL RT COM ILLIAC ED VEL: 16 CM/S
ABDOMINAL SUPRARENAL AORTA AP: 1.69 CM
ABDOMINAL SUPRARENAL AORTA ED VEL: 19 CM/S
ABDOMINAL SUPRARENAL AORTA PS VEL: 98 CM/S
ABDOMINAL SUPRARENAL AORTA TRANS: 1.45 CM

## 2019-10-21 PROCEDURE — 93978 CV US ABDOMINAL AORTA EVALUATION (CUPID ONLY): ICD-10-PCS | Mod: HCNC,S$GLB,, | Performed by: INTERNAL MEDICINE

## 2019-10-21 PROCEDURE — 93978 VASCULAR STUDY: CPT | Mod: HCNC,S$GLB,, | Performed by: INTERNAL MEDICINE

## 2020-06-23 ENCOUNTER — TELEPHONE (OUTPATIENT)
Dept: INTERNAL MEDICINE | Facility: CLINIC | Age: 75
End: 2020-06-23

## 2020-06-23 NOTE — TELEPHONE ENCOUNTER
----- Message from Indigo Duron sent at 6/23/2020  3:04 PM CDT -----  Contact: Pt's wife-- [phone]  Type:  Needs Medical Advice    Who Called:  Pt's wife    Symptoms (please be specific): pre--op surgery 9/2    Would the patient rather a call back or a response via [facility]?call    Best Call Back Number:  [phone]     Additional Information:  Pt's wife states pt needs EKG, CBC, LYTES before surgery in September. She is requesting a call back to schedule.

## 2020-08-11 ENCOUNTER — OFFICE VISIT (OUTPATIENT)
Dept: ALLERGY | Facility: CLINIC | Age: 75
End: 2020-08-11
Payer: MEDICARE

## 2020-08-11 VITALS — HEIGHT: 71 IN | BODY MASS INDEX: 25.46 KG/M2 | WEIGHT: 181.88 LBS | TEMPERATURE: 98 F

## 2020-08-11 DIAGNOSIS — J30.89 ALLERGIC RHINITIS DUE TO DUST MITE: ICD-10-CM

## 2020-08-11 DIAGNOSIS — L29.9 PRURITUS: ICD-10-CM

## 2020-08-11 DIAGNOSIS — T78.3XXD ANGIOEDEMA, SUBSEQUENT ENCOUNTER: Primary | ICD-10-CM

## 2020-08-11 DIAGNOSIS — L50.8 CHRONIC URTICARIA: ICD-10-CM

## 2020-08-11 PROCEDURE — 1101F PR PT FALLS ASSESS DOC 0-1 FALLS W/OUT INJ PAST YR: ICD-10-PCS | Mod: HCNC,CPTII,S$GLB, | Performed by: ALLERGY & IMMUNOLOGY

## 2020-08-11 PROCEDURE — 99214 OFFICE O/P EST MOD 30 MIN: CPT | Mod: HCNC,S$GLB,, | Performed by: ALLERGY & IMMUNOLOGY

## 2020-08-11 PROCEDURE — 1101F PT FALLS ASSESS-DOCD LE1/YR: CPT | Mod: HCNC,CPTII,S$GLB, | Performed by: ALLERGY & IMMUNOLOGY

## 2020-08-11 PROCEDURE — 1126F AMNT PAIN NOTED NONE PRSNT: CPT | Mod: HCNC,S$GLB,, | Performed by: ALLERGY & IMMUNOLOGY

## 2020-08-11 PROCEDURE — 3008F BODY MASS INDEX DOCD: CPT | Mod: HCNC,CPTII,S$GLB, | Performed by: ALLERGY & IMMUNOLOGY

## 2020-08-11 PROCEDURE — 99999 PR PBB SHADOW E&M-EST. PATIENT-LVL III: CPT | Mod: PBBFAC,HCNC,, | Performed by: ALLERGY & IMMUNOLOGY

## 2020-08-11 PROCEDURE — 99999 PR PBB SHADOW E&M-EST. PATIENT-LVL III: ICD-10-PCS | Mod: PBBFAC,HCNC,, | Performed by: ALLERGY & IMMUNOLOGY

## 2020-08-11 PROCEDURE — 3008F PR BODY MASS INDEX (BMI) DOCUMENTED: ICD-10-PCS | Mod: HCNC,CPTII,S$GLB, | Performed by: ALLERGY & IMMUNOLOGY

## 2020-08-11 PROCEDURE — 99214 PR OFFICE/OUTPT VISIT, EST, LEVL IV, 30-39 MIN: ICD-10-PCS | Mod: HCNC,S$GLB,, | Performed by: ALLERGY & IMMUNOLOGY

## 2020-08-11 PROCEDURE — 1159F PR MEDICATION LIST DOCUMENTED IN MEDICAL RECORD: ICD-10-PCS | Mod: HCNC,S$GLB,, | Performed by: ALLERGY & IMMUNOLOGY

## 2020-08-11 PROCEDURE — 1126F PR PAIN SEVERITY QUANTIFIED, NO PAIN PRESENT: ICD-10-PCS | Mod: HCNC,S$GLB,, | Performed by: ALLERGY & IMMUNOLOGY

## 2020-08-11 PROCEDURE — 1159F MED LIST DOCD IN RCRD: CPT | Mod: HCNC,S$GLB,, | Performed by: ALLERGY & IMMUNOLOGY

## 2020-08-11 RX ORDER — CHOLECALCIFEROL (VITAMIN D3) 125 MCG
CAPSULE ORAL
COMMUNITY
End: 2021-06-02

## 2020-08-11 NOTE — PROGRESS NOTES
Chief Complaint: No chief complaint on file.        Subjective:         HPI    Kvng Redd is a 74 y.o. male here for continued evaluation of angioedema, allergic rhinitis and prior history of hives. He was last seen 5/17/2019. He states this started in 2016. He saw [facility] and had labs and all normal. Then had skin test inhalants positive to dust mites and skin test foods all negative. 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 He takes montelukast 10 mg and cetirizine 20 mg every night. This keeps it controlled. He occ feels tingling in lips like swelling is coming but never swells. Prior to meds had frequent lip, eye and facial swelling. He denies hives but was reported in prior notes. He has not missed meds and is well controlled. He has no rhinitis on this regimen, no congestion, runny nose or sneeze. He used to get sneeze and runny nose often, feels zyrtec controls it. No cough SOB or wheeze.          Review of Systems  Constitutional: Negative for changes in appetite, unintentional weight loss, fever, chills and fatigue.   HENT: Negative for facial pain, nose bleeds, nasal congestion, postnasal drip, throat clearing, sinus pressure, and voice change. Negative for ear discharge, ear pain, facial swelling, sore throat and trouble swallowing.  Eyes: Negative for occular discharge, redness, itching and visual disturbance.  Respiratory: Negative for chest tightness, shortness of breath, wheezing, dyspnea on exertion, sputum production and cough.   Cardiovascular: Negative for chest pain, palpitations and leg swelling.  Gastrointestinal: Negative for abdominal distension, abdominal pain, constipation, diarrhea, nausea,and vomiting.   Genitourinary: Negative for difficulty urinating.   Musculoskeletal: Negative for arthralgias, gait problems, joint swelling, myalgias and back pain.   Neurological: Negative for dizziness, syncope, weakness, light-headedness, and headaches.   Hematological: Negative for adenopathy,  does not bruise or bleed easily.  Psychiatric/Behavioral: Negative for agitation, anxiety, behavioral problems, confusion, and insomnia.  Skin: Positive for angioedema as above.     PMH:  Reviewed with the patient and current for this visit    Family History:  Reviewed with the patient and current for this visit    Social History:  Reviewed with the patient and current for this visit     Environmental History:  Reviewed with the patient and current for this visit          Objective:      Skin Test results: Positive to house dust mites; test was prick skin test done on 4/28/2016.  Skin test for foods done on the same day was negative.The positive histamine control (3+) and the negative saline control (0), indicate that this was a valid assessment of allergic recognition.     Immunotherapy: Patient has not been on allergen specific immunotherapy     Physical Exam  General:patient is well developed and well nourished, in no acute distress.  Mental Status:  Alert, oriented and cooperative  Head and Face: normocephalic   Allergic shiners: No  Eyes:   Pupils: ERRLA: Scleral conjunctiva: clear; Cornea: clear; Palprebal conjunctiva: normal: Eyelid Skin: normal  Ears:Tympanic membrane Left:intact and normal light reflex; Right:intact and normal light reflex; External canals normal bilaterally.  Nose:  Nares: patent; Mucosa :pink and moist; Nasal septum: midline;Turbinates are of normal size bilaterally and do not occlude the nasal airway.  Mouth/Pharynx: tonsils present; posterior pharyngeal wall normal; tongue normal; teeth normal; voice quality normal.  Neck:  Cervical lymph nodes: small, non-tender, freely moveable both anterior and posterior cervical chain; Trachea: midline; Masses: none  Lungs: Air movement is good; respiratory effort is good; no respiratory distress; breath sounds are vesicular in all lung fields; no wheezing; normal expiratory time; no cough.  Heart: regular rate and rhythm with mild respiratory  variation; A1 and P2 are normal; no murmurs or gallops.  Abdomen:exam not done  Extremities: no cyanosis, clubbing or edema  Skin:no rashes or lesions present; skin hydrated and supple.         Assessment:       1. Angioedema, subsequent encounter     2. Chronic urticaria     3. Allergic rhinitis due to dust mite     4. Pruritus             Plan:       1. Continue montelukast 10 mg nightly  2. continue cetirizine 20 mg nightly. As doing well could consider decrease to 10 mg  3. benadryl as needed if flares  4. RTC annually or sooner if needed      There are no Patient Instructions on file for this visit.  25 minutes spent face to face with this patient. 50% of time spent counseling this patient about the medical conditions (pathophysiology), the options and strategies for treatments, and the risks and benefits of treatments.    Patient education content included: safety of medication long-term and the addition of Claritin or Allegra as needed.             Ya Bettencourt MD

## 2020-08-17 ENCOUNTER — LAB VISIT (OUTPATIENT)
Dept: LAB | Facility: HOSPITAL | Age: 75
End: 2020-08-17
Attending: INTERNAL MEDICINE
Payer: MEDICARE

## 2020-08-17 ENCOUNTER — OFFICE VISIT (OUTPATIENT)
Dept: INTERNAL MEDICINE | Facility: CLINIC | Age: 75
End: 2020-08-17
Payer: MEDICARE

## 2020-08-17 ENCOUNTER — TELEPHONE (OUTPATIENT)
Dept: INTERNAL MEDICINE | Facility: CLINIC | Age: 75
End: 2020-08-17

## 2020-08-17 VITALS
OXYGEN SATURATION: 98 % | WEIGHT: 177.5 LBS | DIASTOLIC BLOOD PRESSURE: 62 MMHG | HEIGHT: 70 IN | BODY MASS INDEX: 25.41 KG/M2 | RESPIRATION RATE: 16 BRPM | SYSTOLIC BLOOD PRESSURE: 110 MMHG | TEMPERATURE: 98 F | HEART RATE: 66 BPM

## 2020-08-17 DIAGNOSIS — Z01.818 PRE-OP EXAM: ICD-10-CM

## 2020-08-17 DIAGNOSIS — Z01.818 PRE-OP EVALUATION: Primary | ICD-10-CM

## 2020-08-17 DIAGNOSIS — R94.31 ABNORMAL EKG: Primary | ICD-10-CM

## 2020-08-17 DIAGNOSIS — Z01.818 PRE-OP EVALUATION: ICD-10-CM

## 2020-08-17 DIAGNOSIS — G56.03 CARPAL TUNNEL SYNDROME, BILATERAL: ICD-10-CM

## 2020-08-17 LAB
ANION GAP SERPL CALC-SCNC: 11 MMOL/L (ref 8–16)
BASOPHILS # BLD AUTO: 0.03 K/UL (ref 0–0.2)
BASOPHILS NFR BLD: 0.6 % (ref 0–1.9)
BUN SERPL-MCNC: 29 MG/DL (ref 8–23)
CALCIUM SERPL-MCNC: 9.5 MG/DL (ref 8.7–10.5)
CHLORIDE SERPL-SCNC: 105 MMOL/L (ref 95–110)
CO2 SERPL-SCNC: 23 MMOL/L (ref 23–29)
CREAT SERPL-MCNC: 1 MG/DL (ref 0.5–1.4)
DIFFERENTIAL METHOD: ABNORMAL
EOSINOPHIL # BLD AUTO: 0.3 K/UL (ref 0–0.5)
EOSINOPHIL NFR BLD: 6.1 % (ref 0–8)
ERYTHROCYTE [DISTWIDTH] IN BLOOD BY AUTOMATED COUNT: 13.1 % (ref 11.5–14.5)
EST. GFR  (AFRICAN AMERICAN): >60 ML/MIN/1.73 M^2
EST. GFR  (NON AFRICAN AMERICAN): >60 ML/MIN/1.73 M^2
GLUCOSE SERPL-MCNC: 94 MG/DL (ref 70–110)
HCT VFR BLD AUTO: 39.2 % (ref 40–54)
HGB BLD-MCNC: 12.8 G/DL (ref 14–18)
IMM GRANULOCYTES # BLD AUTO: 0.01 K/UL (ref 0–0.04)
IMM GRANULOCYTES NFR BLD AUTO: 0.2 % (ref 0–0.5)
LYMPHOCYTES # BLD AUTO: 1.3 K/UL (ref 1–4.8)
LYMPHOCYTES NFR BLD: 25.7 % (ref 18–48)
MCH RBC QN AUTO: 30 PG (ref 27–31)
MCHC RBC AUTO-ENTMCNC: 32.7 G/DL (ref 32–36)
MCV RBC AUTO: 92 FL (ref 82–98)
MONOCYTES # BLD AUTO: 0.5 K/UL (ref 0.3–1)
MONOCYTES NFR BLD: 10.1 % (ref 4–15)
NEUTROPHILS # BLD AUTO: 2.8 K/UL (ref 1.8–7.7)
NEUTROPHILS NFR BLD: 57.3 % (ref 38–73)
NRBC BLD-RTO: 0 /100 WBC
PLATELET # BLD AUTO: 173 K/UL (ref 150–350)
PMV BLD AUTO: 11 FL (ref 9.2–12.9)
POTASSIUM SERPL-SCNC: 4.4 MMOL/L (ref 3.5–5.1)
RBC # BLD AUTO: 4.27 M/UL (ref 4.6–6.2)
SODIUM SERPL-SCNC: 139 MMOL/L (ref 136–145)
WBC # BLD AUTO: 4.95 K/UL (ref 3.9–12.7)

## 2020-08-17 PROCEDURE — 85025 COMPLETE CBC W/AUTO DIFF WBC: CPT | Mod: HCNC

## 2020-08-17 PROCEDURE — 99214 PR OFFICE/OUTPT VISIT, EST, LEVL IV, 30-39 MIN: ICD-10-PCS | Mod: HCNC,S$GLB,, | Performed by: INTERNAL MEDICINE

## 2020-08-17 PROCEDURE — 99214 OFFICE O/P EST MOD 30 MIN: CPT | Mod: HCNC,S$GLB,, | Performed by: INTERNAL MEDICINE

## 2020-08-17 PROCEDURE — 93005 EKG 12-LEAD: ICD-10-PCS | Mod: HCNC,S$GLB,, | Performed by: INTERNAL MEDICINE

## 2020-08-17 PROCEDURE — 80048 BASIC METABOLIC PNL TOTAL CA: CPT | Mod: HCNC

## 2020-08-17 PROCEDURE — 99999 PR PBB SHADOW E&M-EST. PATIENT-LVL IV: ICD-10-PCS | Mod: PBBFAC,HCNC,, | Performed by: INTERNAL MEDICINE

## 2020-08-17 PROCEDURE — 1101F PR PT FALLS ASSESS DOC 0-1 FALLS W/OUT INJ PAST YR: ICD-10-PCS | Mod: HCNC,CPTII,S$GLB, | Performed by: INTERNAL MEDICINE

## 2020-08-17 PROCEDURE — 1126F AMNT PAIN NOTED NONE PRSNT: CPT | Mod: HCNC,S$GLB,, | Performed by: INTERNAL MEDICINE

## 2020-08-17 PROCEDURE — 1159F MED LIST DOCD IN RCRD: CPT | Mod: HCNC,S$GLB,, | Performed by: INTERNAL MEDICINE

## 2020-08-17 PROCEDURE — 99999 PR PBB SHADOW E&M-EST. PATIENT-LVL IV: CPT | Mod: PBBFAC,HCNC,, | Performed by: INTERNAL MEDICINE

## 2020-08-17 PROCEDURE — 3008F BODY MASS INDEX DOCD: CPT | Mod: HCNC,CPTII,S$GLB, | Performed by: INTERNAL MEDICINE

## 2020-08-17 PROCEDURE — 3008F PR BODY MASS INDEX (BMI) DOCUMENTED: ICD-10-PCS | Mod: HCNC,CPTII,S$GLB, | Performed by: INTERNAL MEDICINE

## 2020-08-17 PROCEDURE — 93010 EKG 12-LEAD: ICD-10-PCS | Mod: HCNC,S$GLB,, | Performed by: INTERNAL MEDICINE

## 2020-08-17 PROCEDURE — 1159F PR MEDICATION LIST DOCUMENTED IN MEDICAL RECORD: ICD-10-PCS | Mod: HCNC,S$GLB,, | Performed by: INTERNAL MEDICINE

## 2020-08-17 PROCEDURE — 93010 ELECTROCARDIOGRAM REPORT: CPT | Mod: HCNC,S$GLB,, | Performed by: INTERNAL MEDICINE

## 2020-08-17 PROCEDURE — 93005 ELECTROCARDIOGRAM TRACING: CPT | Mod: HCNC,S$GLB,, | Performed by: INTERNAL MEDICINE

## 2020-08-17 PROCEDURE — 1101F PT FALLS ASSESS-DOCD LE1/YR: CPT | Mod: HCNC,CPTII,S$GLB, | Performed by: INTERNAL MEDICINE

## 2020-08-17 PROCEDURE — 1126F PR PAIN SEVERITY QUANTIFIED, NO PAIN PRESENT: ICD-10-PCS | Mod: HCNC,S$GLB,, | Performed by: INTERNAL MEDICINE

## 2020-08-17 PROCEDURE — 36415 COLL VENOUS BLD VENIPUNCTURE: CPT | Mod: HCNC,PO

## 2020-08-17 NOTE — PROGRESS NOTES
Subjective:       Patient ID: Kvng Redd is a 74 y.o. male.    Chief Complaint: Pre-op Exam    HPI   Pt here for pre-op evaluation for B/L Carpal tunnel release scheduled for 9/2/20 by Dr. Mayen 2/2 persistent pain and numbness.  Pt denies any hx of reactions to anesthesia, difficulty with intubation, CAD/MI/CVA or any acute cardiopulmonary complaints today.   Review of Systems   Constitutional: Negative for activity change, appetite change, chills, diaphoresis, fatigue, fever and unexpected weight change.   HENT: Negative for nasal congestion, mouth sores, postnasal drip, rhinorrhea, sinus pressure/congestion, sneezing, sore throat, trouble swallowing and voice change.    Eyes: Negative for discharge, itching and visual disturbance.   Respiratory: Negative for cough, chest tightness, shortness of breath and wheezing.    Cardiovascular: Negative for chest pain, palpitations and leg swelling.   Gastrointestinal: Negative for abdominal pain, blood in stool, constipation, diarrhea, nausea and vomiting.   Endocrine: Negative for cold intolerance and heat intolerance.   Genitourinary: Negative for difficulty urinating, dysuria, flank pain, hematuria and urgency.   Musculoskeletal: Negative for arthralgias, back pain, myalgias and neck pain.   Integumentary:  Negative for rash and wound.   Allergic/Immunologic: Negative for environmental allergies and food allergies.   Neurological: Positive for numbness. Negative for dizziness, tremors, seizures, syncope, weakness and headaches.   Hematological: Negative for adenopathy. Does not bruise/bleed easily.   Psychiatric/Behavioral: Negative for confusion, sleep disturbance and suicidal ideas. The patient is not nervous/anxious.          Objective:      Physical Exam  Constitutional:       General: He is not in acute distress.     Appearance: He is well-developed. He is not diaphoretic.   HENT:      Head: Normocephalic and atraumatic.      Right Ear: External ear normal.       Left Ear: External ear normal.      Nose: Nose normal.      Mouth/Throat:      Pharynx: No oropharyngeal exudate.   Eyes:      General: No scleral icterus.        Right eye: No discharge.         Left eye: No discharge.      Conjunctiva/sclera: Conjunctivae normal.      Pupils: Pupils are equal, round, and reactive to light.   Neck:      Musculoskeletal: Normal range of motion and neck supple.      Thyroid: No thyromegaly.      Vascular: No JVD.   Cardiovascular:      Rate and Rhythm: Normal rate and regular rhythm.      Heart sounds: Normal heart sounds. No murmur.   Pulmonary:      Effort: Pulmonary effort is normal. No respiratory distress.      Breath sounds: Normal breath sounds. No wheezing or rales.   Abdominal:      General: Bowel sounds are normal. There is no distension.      Palpations: Abdomen is soft.      Tenderness: There is no abdominal tenderness. There is no guarding.   Lymphadenopathy:      Cervical: No cervical adenopathy.   Skin:     General: Skin is warm and dry.      Coloration: Skin is not pale.      Findings: No rash.   Neurological:      Mental Status: He is alert and oriented to person, place, and time.   Psychiatric:         Judgment: Judgment normal.         Assessment:       1. Pre-op evaluation    2. Carpal tunnel syndrome, bilateral        Plan:    1. Check labs/EKG   2. Surgical release scheduled for 9/2/20    Pt has no clinical predictors. He is going for a low risk procedure. Has good   functional Capacity at 4 mets. At this time there are no contraindications to hand surgery.

## 2020-08-18 ENCOUNTER — TELEPHONE (OUTPATIENT)
Dept: INTERNAL MEDICINE | Facility: CLINIC | Age: 75
End: 2020-08-18

## 2020-08-18 NOTE — TELEPHONE ENCOUNTER
----- Message from Caleb Martinez sent at 8/17/2020  2:45 PM CDT -----  Regarding: Mr. [phone]  Type:  Patient Returning Call    Who Called: Mr. Calderon    Who Left Message for Patient: Doctor Radha    Does the patient know what this is regarding?: Returning a phone call    Would the patient rather a call back or a response via [facility]? Callback    Best Call Back Number: Mr. CalderonLfujubj-[phone]    Additional Information: Mr. Calderon is requesting a callback.

## 2020-08-19 ENCOUNTER — OFFICE VISIT (OUTPATIENT)
Dept: CARDIOLOGY | Facility: CLINIC | Age: 75
End: 2020-08-19
Payer: MEDICARE

## 2020-08-19 VITALS
SYSTOLIC BLOOD PRESSURE: 130 MMHG | HEIGHT: 71 IN | HEART RATE: 68 BPM | WEIGHT: 181.44 LBS | DIASTOLIC BLOOD PRESSURE: 70 MMHG | BODY MASS INDEX: 25.4 KG/M2

## 2020-08-19 DIAGNOSIS — T78.3XXD ANGIOEDEMA, SUBSEQUENT ENCOUNTER: ICD-10-CM

## 2020-08-19 DIAGNOSIS — Z01.810 PREOPERATIVE CARDIOVASCULAR EXAMINATION: Primary | ICD-10-CM

## 2020-08-19 DIAGNOSIS — R94.31 NONSPECIFIC ABNORMAL ELECTROCARDIOGRAM (ECG) (EKG): ICD-10-CM

## 2020-08-19 DIAGNOSIS — R94.31 ABNORMAL EKG: ICD-10-CM

## 2020-08-19 DIAGNOSIS — E78.2 MIXED HYPERLIPIDEMIA: ICD-10-CM

## 2020-08-19 DIAGNOSIS — Z01.818 PRE-OP EXAM: ICD-10-CM

## 2020-08-19 PROCEDURE — 1126F AMNT PAIN NOTED NONE PRSNT: CPT | Mod: HCNC,S$GLB,, | Performed by: INTERNAL MEDICINE

## 2020-08-19 PROCEDURE — 99999 PR PBB SHADOW E&M-EST. PATIENT-LVL IV: ICD-10-PCS | Mod: PBBFAC,HCNC,, | Performed by: INTERNAL MEDICINE

## 2020-08-19 PROCEDURE — 1101F PT FALLS ASSESS-DOCD LE1/YR: CPT | Mod: HCNC,CPTII,S$GLB, | Performed by: INTERNAL MEDICINE

## 2020-08-19 PROCEDURE — 1159F MED LIST DOCD IN RCRD: CPT | Mod: HCNC,S$GLB,, | Performed by: INTERNAL MEDICINE

## 2020-08-19 PROCEDURE — 3008F PR BODY MASS INDEX (BMI) DOCUMENTED: ICD-10-PCS | Mod: HCNC,CPTII,S$GLB, | Performed by: INTERNAL MEDICINE

## 2020-08-19 PROCEDURE — 99999 PR PBB SHADOW E&M-EST. PATIENT-LVL IV: CPT | Mod: PBBFAC,HCNC,, | Performed by: INTERNAL MEDICINE

## 2020-08-19 PROCEDURE — 1101F PR PT FALLS ASSESS DOC 0-1 FALLS W/OUT INJ PAST YR: ICD-10-PCS | Mod: HCNC,CPTII,S$GLB, | Performed by: INTERNAL MEDICINE

## 2020-08-19 PROCEDURE — 99205 PR OFFICE/OUTPT VISIT, NEW, LEVL V, 60-74 MIN: ICD-10-PCS | Mod: HCNC,S$GLB,, | Performed by: INTERNAL MEDICINE

## 2020-08-19 PROCEDURE — 3008F BODY MASS INDEX DOCD: CPT | Mod: HCNC,CPTII,S$GLB, | Performed by: INTERNAL MEDICINE

## 2020-08-19 PROCEDURE — 1126F PR PAIN SEVERITY QUANTIFIED, NO PAIN PRESENT: ICD-10-PCS | Mod: HCNC,S$GLB,, | Performed by: INTERNAL MEDICINE

## 2020-08-19 PROCEDURE — 1159F PR MEDICATION LIST DOCUMENTED IN MEDICAL RECORD: ICD-10-PCS | Mod: HCNC,S$GLB,, | Performed by: INTERNAL MEDICINE

## 2020-08-19 PROCEDURE — 99205 OFFICE O/P NEW HI 60 MIN: CPT | Mod: HCNC,S$GLB,, | Performed by: INTERNAL MEDICINE

## 2020-08-19 RX ORDER — ATORVASTATIN CALCIUM 40 MG/1
40 TABLET, FILM COATED ORAL DAILY
Qty: 90 TABLET | Refills: 3 | Status: SHIPPED | OUTPATIENT
Start: 2020-08-19 | End: 2020-11-23 | Stop reason: SDUPTHER

## 2020-08-19 NOTE — PATIENT INSTRUCTIONS
Assessment/Plan:  Kvng Redd is a 74 y.o. male with a past medical history of angio-edema, former smoker, who presents for an initial appointment.     1.  Preoperative Cardiac Risk Stratification Prior to Planned Bilateral Carpal tunnel Release Surgery- Mr. Redd has no chest pain, no heart failure symptoms, and no documented arrhythmia(s).  He can perform greater than 4 METS with no difficulty.  EKG from 8/17/2020 shows normal sinus rhythm with nonspecific ST/T wave changes.  Exercise stress echo from 10/15/2018 showed normal LV systolic function with EF of 60-65%; indeterminate LV diastolic function; normal RV systolic function; estimated PA systolic pressure is 21 mmHg; no evidence of stress induced myocardial ischemia.  He is at low to moderate risk for a perioperative major adverse cardiac event during this moderate risk procedure.  Revised Cardiac Risk Index of 3.9%.  No further testing indicated.  OK to proceed with surgery.      2. Abnormal EKG- Pt is asymptomatic.  Exercise stress echo from 10/15/2018 showed normal LV systolic function with EF of 60-65%; indeterminate LV diastolic function; normal RV systolic function; estimated PA systolic pressure is 21 mmHg; no evidence of stress induced myocardial ischemia.     3. HLD- Ten year ASCVD risk score elevated at 22%.  Start ASA 81 mg daily and atorvastatin 40 mg daily.      Follow up in 3 months with lipids and cmp prior

## 2020-08-19 NOTE — PROGRESS NOTES
[facility]    CC: Preoperative Cardiac Risk Stratification Prior to Planned Bilateral Carpal tunnel Release Surgery     Patient ID: Kvng Redd is a 74 y.o. male with a past medical history of angio-edema, former smoker, who presents for an initial appointment.  Pertinent history/events are as follows:     Pt kindy referred by Dr. Garcia for Preoperative Cardiac Risk Stratification Prior to Planned Bilateral Carpal tunnel Release Surgery.     HPI:  Mr. Redd reports no chest pain, SOB, LE edema, palpitations, TIA symptom or syncope. Can perform greater than 4 METS with no difficulty.  Formerly smoked a pack a day for 20 years.  Quit in 1980.  He is scheduled for bilateral carpal tunnel release surgery on 9/2/20 with Dr. Mayen.  EKG from 8/17/2020 shows normal sinus rhythm with nonspecific ST/T wave changes.      Past Medical History:   Diagnosis Date    Allergy     Angio-edema     BPH (benign prostatic hyperplasia)     Chronic idiopathic urticaria 5/8/2017     Past Surgical History:   Procedure Laterality Date    ADENOIDECTOMY      HERNIA REPAIR      right shoulder replacement      SINUS SURGERY      rhinoplasty    SPINE SURGERY      Four lumbar spine surgeries    TONSILLECTOMY       Social History     Socioeconomic History    Marital status:      Spouse name: Not on file    Number of children: Not on file    Years of education: Not on file    Highest education level: Not on file   Occupational History    Not on file   Social Needs    Financial resource strain: Not hard at all    Food insecurity     Worry: Never true     Inability: Never true    Transportation needs     Medical: No     Non-medical: No   Tobacco Use    Smoking status: Former Smoker     Packs/day: 1.00     Years: 34.00     Pack years: 34.00     Types: Cigarettes    Smokeless tobacco: Former User     Quit date: 1/1/1980   Substance and Sexual Activity    Alcohol use: No     Frequency: Never      Drinks per session: Patient refused     Binge frequency: Patient refused    Drug use: No    Sexual activity: Yes     Partners: Female   Lifestyle    Physical activity     Days per week: 6 days     Minutes per session: 20 min    Stress: Not at all   Relationships    Social connections     Talks on phone: Twice a week     Gets together: Once a week     Attends [employer] service: Not on file     Active member of club or organization: Yes     Attends meetings of clubs or organizations: More than 4 times per year     Relationship status:    Other Topics Concern    Not on file   Social History Narrative    Not on file     Family History   Problem Relation Age of Onset    Suicide Sister     Lung cancer Father     Cancer Father     Cancer Mother     Tuberculosis Mother        Review of patient's allergies indicates:  No Known Allergies    Medication List with Changes/Refills   Current Medications    CETIRIZINE (ZYRTEC) 10 MG TABLET    Take 10 mg by mouth every evening.     CYANOCOBALAMIN (VITAMIN B-12) 1000 MCG TABLET    Take 100 mcg by mouth once daily.    DOXAZOSIN (CARDURA) 4 MG TABLET    Take 4 mg by mouth once daily.     GLUCOSAMINE-CHONDROITIN 500-400 MG TABLET    Take 1 tablet by mouth once daily.    IBUPROFEN (ADVIL,MOTRIN) 200 MG TABLET    Take 800 mg by mouth every 6 (six) hours as needed for Pain.    MELATONIN 1 MG TAB    Take 1 mg by mouth every evening.    MONTELUKAST (SINGULAIR) 10 MG TABLET    TAKE 1 TABLET EVERY EVENING    MULTIVITAMIN ORAL    Take by mouth.    NAPROXEN SODIUM (ALEVE) 220 MG CAP    Take by mouth. Patient takes Aleve PRN    TURMERIC, BULK, MISC    1,000 mg by Misc.(Non-Drug; Combo Route) route once daily.       Review of Systems  Constitution: Denies chills, fever, and sweats.  HENT: Denies headaches or blurry vision.  Cardiovascular: Denies chest pain or irregular heart beat.  Respiratory: Denies cough or shortness of breath.  Gastrointestinal: Denies abdominal pain,  "nausea, or vomiting.  Musculoskeletal: Denies muscle cramps.  Neurological: Denies dizziness or focal weakness.  Psychiatric/Behavioral: Normal mental status.  Hematologic/Lymphatic: Denies bleeding problem or easy bruising/bleeding.  Skin: Denies rash or suspicious lesions    Physical Examination  Ht 5' 11" (1.803 m)   Wt 82.3 kg (181 lb 7 oz)   BMI 25.31 kg/m²     Constitutional: No acute distress, conversant  HEENT: Sclera anicteric, Pupils equal, round and reactive to light, extraocular motions intact, Oropharynx clear  Neck: No JVD, no carotid bruits  Cardiovascular: regular rate and rhythm, no murmur, rubs or gallops, normal S1/S2  Pulmonary: Clear to auscultation bilaterally  Abdominal: Abdomen soft, nontender, nondistended, positive bowel sounds  Extremities: No lower extremity edema,   Pulses:  Carotid pulses are 2+ on the right side, and 2+ on the left side.  Radial pulses are 2+ on the right side, and 2+ on the left side.   Femoral pulses are 2+ on the right side, and 2+ on the left side.  Popliteal pulses are 2+ on the right side, and 2+ on the left side.   Dorsalis pedis pulses are 2+ on the right side, and 2+ on the left side.   Posterior tibial pulses are 2+ on the right side, and 2+ on the left side.    Skin: No ecchymosis, erythema, or ulcers  Psych: Alert and oriented x 3, appropriate affect  Neuro: CNII-XII intact, no focal deficits    Labs:  Most Recent Data  CBC:   Lab Results   Component Value Date    WBC 4.95 08/17/2020    HGB 12.8 (L) 08/17/2020    HCT 39.2 (L) 08/17/2020     08/17/2020    MCV 92 08/17/2020    RDW 13.1 08/17/2020     BMP:   Lab Results   Component Value Date     08/17/2020    K 4.4 08/17/2020     08/17/2020    CO2 23 08/17/2020    BUN 29 (H) 08/17/2020    CREATININE 1.0 08/17/2020    GLU 94 08/17/2020    CALCIUM 9.5 08/17/2020     LFTS;   Lab Results   Component Value Date    PROT 7.6 10/07/2019    ALBUMIN 4.4 10/07/2019    BILITOT 0.6 10/07/2019    AST " 25 10/07/2019    ALKPHOS 82 10/07/2019    ALT 26 10/07/2019     COAGS: No results found for: INR, PROTIME, PTT  FLP:   Lab Results   Component Value Date    CHOL 185 10/07/2019    HDL 62 10/07/2019    LDLCALC 108.8 10/07/2019    TRIG 71 10/07/2019    CHOLHDL 33.5 10/07/2019     CARDIAC: No results found for: TROPONINI, CKMB, BNP      EKG 8/17/2020:  Normal sinus rhythm  Nonspecific ST/T wave changes    Exercise Stress Echo 10/15/2018:  CONCLUSIONS     1 - Normal left ventricular systolic function (EF 60-65%).     2 - No wall motion abnormalities.     3 - Indeterminate LV diastolic function.     4 - Normal right ventricular systolic function .     5 - The estimated PA systolic pressure is 21 mmHg.     No evidence of stress induced myocardial ischemia.       Assessment/Plan:  Kvng Redd is a 74 y.o. male with a past medical history of angio-edema, former smoker, who presents for an initial appointment.     1.  Preoperative Cardiac Risk Stratification Prior to Planned Bilateral Carpal tunnel Release Surgery- Mr. Redd has no chest pain, no heart failure symptoms, and no documented arrhythmia(s).  He can perform greater than 4 METS with no difficulty.  EKG from 8/17/2020 shows normal sinus rhythm with nonspecific ST/T wave changes.  Exercise stress echo from 10/15/2018 showed normal LV systolic function with EF of 60-65%; indeterminate LV diastolic function; normal RV systolic function; estimated PA systolic pressure is 21 mmHg; no evidence of stress induced myocardial ischemia.  He is at low to moderate risk for a perioperative major adverse cardiac event during this moderate risk procedure.  Revised Cardiac Risk Index of 3.9%.  No further testing indicated.  OK to proceed with surgery.      2. Abnormal EKG- Pt is asymptomatic.  Exercise stress echo from 10/15/2018 showed normal LV systolic function with EF of 60-65%; indeterminate LV diastolic function; normal RV systolic function; estimated PA systolic  pressure is 21 mmHg; no evidence of stress induced myocardial ischemia.     3. HLD- Ten year ASCVD risk score elevated at 22%.  Start ASA 81 mg daily and atorvastatin 40 mg daily.      Follow up in 3 months with lipids and cmp prior    Total duration of face to face visit time 30 minutes.  Total time spent counseling greater than fifty percent of total visit time.  Counseling included discussion regarding imaging findings, diagnosis, possibilities, treatment options, risks and benefits.  The patient had many questions regarding the options and long-term effects.    Hemanth Gerardo MD, PhD  Interventional Cardiology

## 2020-08-19 NOTE — LETTER
August 19, 2020      Elio Garcia DO  [address] [facility]  [place] [address]           [place] - Cardiology  [address] [facility]  [place] [address]  Phone: [phone]          Patient: Kvng Redd   MR Number: [id number]   YOB: 1945   Date of Visit: 8/19/2020       Dear Dr. Elio Garcia:    Thank you for referring Kvng Redd to me for evaluation. Attached you will find relevant portions of my assessment and plan of care.    If you have questions, please do not hesitate to call me. I look forward to following Kvng Redd along with you.    Sincerely,    Hemanth Gerardo MD PhD    Enclosure  CC:  No Recipients    If you would like to receive this communication electronically, please contact [email or web address] or [phone] to request more information on [employer] access.    For providers and/or their staff who would like to refer a patient to [facility], please contact us through our one-stop-shop provider referral line, [facility] , at [phone].    If you feel you have received this communication in error or would no longer like to receive these types of communications, please e-mail [email or web address]

## 2020-11-16 ENCOUNTER — LAB VISIT (OUTPATIENT)
Dept: LAB | Facility: HOSPITAL | Age: 75
End: 2020-11-16
Attending: INTERNAL MEDICINE
Payer: MEDICARE

## 2020-11-16 DIAGNOSIS — E78.2 MIXED HYPERLIPIDEMIA: ICD-10-CM

## 2020-11-16 LAB
ALBUMIN SERPL BCP-MCNC: 4 G/DL (ref 3.5–5.2)
ALP SERPL-CCNC: 80 U/L (ref 55–135)
ALT SERPL W/O P-5'-P-CCNC: 31 U/L (ref 10–44)
ANION GAP SERPL CALC-SCNC: 7 MMOL/L (ref 8–16)
AST SERPL-CCNC: 29 U/L (ref 10–40)
BILIRUB SERPL-MCNC: 0.7 MG/DL (ref 0.1–1)
BUN SERPL-MCNC: 18 MG/DL (ref 8–23)
CALCIUM SERPL-MCNC: 9.1 MG/DL (ref 8.7–10.5)
CHLORIDE SERPL-SCNC: 105 MMOL/L (ref 95–110)
CHOLEST SERPL-MCNC: 117 MG/DL (ref 120–199)
CHOLEST/HDLC SERPL: 2.2 {RATIO} (ref 2–5)
CO2 SERPL-SCNC: 28 MMOL/L (ref 23–29)
CREAT SERPL-MCNC: 0.9 MG/DL (ref 0.5–1.4)
EST. GFR  (AFRICAN AMERICAN): >60 ML/MIN/1.73 M^2
EST. GFR  (NON AFRICAN AMERICAN): >60 ML/MIN/1.73 M^2
GLUCOSE SERPL-MCNC: 95 MG/DL (ref 70–110)
HDLC SERPL-MCNC: 53 MG/DL (ref 40–75)
HDLC SERPL: 45.3 % (ref 20–50)
LDLC SERPL CALC-MCNC: 54.6 MG/DL (ref 63–159)
NONHDLC SERPL-MCNC: 64 MG/DL
POTASSIUM SERPL-SCNC: 4 MMOL/L (ref 3.5–5.1)
PROT SERPL-MCNC: 6.8 G/DL (ref 6–8.4)
SODIUM SERPL-SCNC: 140 MMOL/L (ref 136–145)
TRIGL SERPL-MCNC: 47 MG/DL (ref 30–150)

## 2020-11-16 PROCEDURE — 36415 COLL VENOUS BLD VENIPUNCTURE: CPT | Mod: HCNC,PO

## 2020-11-16 PROCEDURE — 80053 COMPREHEN METABOLIC PANEL: CPT | Mod: HCNC

## 2020-11-16 PROCEDURE — 80061 LIPID PANEL: CPT | Mod: HCNC

## 2020-11-23 ENCOUNTER — OFFICE VISIT (OUTPATIENT)
Dept: CARDIOLOGY | Facility: CLINIC | Age: 75
End: 2020-11-23
Payer: MEDICARE

## 2020-11-23 VITALS
WEIGHT: 182.31 LBS | SYSTOLIC BLOOD PRESSURE: 114 MMHG | HEIGHT: 71 IN | HEART RATE: 64 BPM | DIASTOLIC BLOOD PRESSURE: 60 MMHG | BODY MASS INDEX: 25.52 KG/M2

## 2020-11-23 DIAGNOSIS — I67.89 OTHER CEREBROVASCULAR DISEASE: ICD-10-CM

## 2020-11-23 DIAGNOSIS — G56.03 BILATERAL CARPAL TUNNEL SYNDROME: ICD-10-CM

## 2020-11-23 DIAGNOSIS — Z87.891 FORMER SMOKER: ICD-10-CM

## 2020-11-23 DIAGNOSIS — E78.2 MIXED HYPERLIPIDEMIA: ICD-10-CM

## 2020-11-23 DIAGNOSIS — Z01.810 PREOPERATIVE CARDIOVASCULAR EXAMINATION: ICD-10-CM

## 2020-11-23 DIAGNOSIS — R94.31 NONSPECIFIC ABNORMAL ELECTROCARDIOGRAM (ECG) (EKG): ICD-10-CM

## 2020-11-23 PROCEDURE — 1101F PT FALLS ASSESS-DOCD LE1/YR: CPT | Mod: HCNC,CPTII,S$GLB, | Performed by: INTERNAL MEDICINE

## 2020-11-23 PROCEDURE — 99999 PR PBB SHADOW E&M-EST. PATIENT-LVL IV: ICD-10-PCS | Mod: PBBFAC,HCNC,, | Performed by: INTERNAL MEDICINE

## 2020-11-23 PROCEDURE — 3288F FALL RISK ASSESSMENT DOCD: CPT | Mod: HCNC,CPTII,S$GLB, | Performed by: INTERNAL MEDICINE

## 2020-11-23 PROCEDURE — 1159F PR MEDICATION LIST DOCUMENTED IN MEDICAL RECORD: ICD-10-PCS | Mod: HCNC,S$GLB,, | Performed by: INTERNAL MEDICINE

## 2020-11-23 PROCEDURE — 99215 PR OFFICE/OUTPT VISIT, EST, LEVL V, 40-54 MIN: ICD-10-PCS | Mod: HCNC,S$GLB,, | Performed by: INTERNAL MEDICINE

## 2020-11-23 PROCEDURE — 1159F MED LIST DOCD IN RCRD: CPT | Mod: HCNC,S$GLB,, | Performed by: INTERNAL MEDICINE

## 2020-11-23 PROCEDURE — 1126F AMNT PAIN NOTED NONE PRSNT: CPT | Mod: HCNC,S$GLB,, | Performed by: INTERNAL MEDICINE

## 2020-11-23 PROCEDURE — 1101F PR PT FALLS ASSESS DOC 0-1 FALLS W/OUT INJ PAST YR: ICD-10-PCS | Mod: HCNC,CPTII,S$GLB, | Performed by: INTERNAL MEDICINE

## 2020-11-23 PROCEDURE — 99215 OFFICE O/P EST HI 40 MIN: CPT | Mod: HCNC,S$GLB,, | Performed by: INTERNAL MEDICINE

## 2020-11-23 PROCEDURE — 3288F PR FALLS RISK ASSESSMENT DOCUMENTED: ICD-10-PCS | Mod: HCNC,CPTII,S$GLB, | Performed by: INTERNAL MEDICINE

## 2020-11-23 PROCEDURE — 1126F PR PAIN SEVERITY QUANTIFIED, NO PAIN PRESENT: ICD-10-PCS | Mod: HCNC,S$GLB,, | Performed by: INTERNAL MEDICINE

## 2020-11-23 PROCEDURE — 99999 PR PBB SHADOW E&M-EST. PATIENT-LVL IV: CPT | Mod: PBBFAC,HCNC,, | Performed by: INTERNAL MEDICINE

## 2020-11-23 RX ORDER — INFLUENZA A VIRUS A/MICHIGAN/45/2015 X-275 (H1N1) ANTIGEN (FORMALDEHYDE INACTIVATED), INFLUENZA A VIRUS A/SINGAPORE/INFIMH-16-0019/2016 IVR-186 (H3N2) ANTIGEN (FORMALDEHYDE INACTIVATED), INFLUENZA B VIRUS B/PHUKET/3073/2013 ANTIGEN (FORMALDEHYDE INACTIVATED), AND INFLUENZA B VIRUS B/MARYLAND/15/2016 BX-69A ANTIGEN (FORMALDEHYDE INACTIVATED) 60; 60; 60; 60 UG/.7ML; UG/.7ML; UG/.7ML; UG/.7ML
INJECTION, SUSPENSION INTRAMUSCULAR
COMMUNITY
Start: 2020-10-05 | End: 2021-06-02

## 2020-11-23 RX ORDER — ATORVASTATIN CALCIUM 40 MG/1
40 TABLET, FILM COATED ORAL DAILY
Qty: 90 TABLET | Refills: 3 | Status: SHIPPED | OUTPATIENT
Start: 2020-11-23 | End: 2021-09-23

## 2020-11-23 NOTE — PATIENT INSTRUCTIONS
Assessment/Plan:  Kvng Redd is a 75 y.o. male with a past medical history of angio-edema, former smoker, who presents for a follow up appointment.     1.  Preoperative Cardiac Risk Stratification Prior to Planned Bilateral Carpal tunnel Release Surgery- Mr. Redd has no chest pain, no heart failure symptoms, and no documented arrhythmia(s).  He can perform greater than 4 METS with no difficulty.  EKG from 8/17/2020 shows normal sinus rhythm with nonspecific ST/T wave changes.  Exercise stress echo from 10/15/2018 showed normal LV systolic function with EF of 60-65%; indeterminate LV diastolic function; normal RV systolic function; estimated PA systolic pressure is 21 mmHg; no evidence of stress induced myocardial ischemia.  He is at low to moderate risk for a perioperative major adverse cardiac event during this moderate risk procedure.  Revised Cardiac Risk Index of 3.9%.  No further testing indicated.  OK to proceed with surgery.      2. Abnormal EKG- Pt is asymptomatic.  Exercise stress echo from 10/15/2018 showed normal LV systolic function with EF of 60-65%; indeterminate LV diastolic function; normal RV systolic function; estimated PA systolic pressure is 21 mmHg; no evidence of stress induced myocardial ischemia.     3. HLD- Ten year ASCVD risk score elevated at 22%.   Labs from 11/16/2020 shows total LDL of 55 vs 109 on 10/7/2019.  LFT's within normal limits.  Continue ASA 81 mg daily and atorvastatin 40 mg daily.      Follow up in 6 months with lipids and carotid ultrasound prior

## 2020-11-23 NOTE — PROGRESS NOTES
[facility]    CC: Preoperative Cardiac Risk Stratification Prior to Planned Bilateral Carpal tunnel Release Surgery     Patient ID: Kvng Redd is a 75 y.o. male with a past medical history of angio-edema, former smoker, who presents for a follow up appointment.  Pertinent history/events are as follows:     -Pt kindy referred by Dr. Garcia for Preoperative Cardiac Risk Stratification Prior to Planned Bilateral Carpal tunnel Release Surgery.     -At our initial clinic visit on 8/19/2020, Mr. Redd reports no chest pain, SOB, LE edema, palpitations, TIA symptom or syncope. Can perform greater than 4 METS with no difficulty.  Formerly smoked a pack a day for 20 years.  Quit in 1980.  He is scheduled for bilateral carpal tunnel release surgery on 9/2/20 with Dr. Mayen.  EKG from 8/17/2020 shows normal sinus rhythm with nonspecific ST/T wave changes.    Plan:   Preoperative Cardiac Risk Stratification Prior to Planned Bilateral Carpal tunnel Release Surgery- Mr. Redd has no chest pain, no heart failure symptoms, and no documented arrhythmia(s).  He can perform greater than 4 METS with no difficulty.  EKG from 8/17/2020 shows normal sinus rhythm with nonspecific ST/T wave changes.  Exercise stress echo from 10/15/2018 showed normal LV systolic function with EF of 60-65%; indeterminate LV diastolic function; normal RV systolic function; estimated PA systolic pressure is 21 mmHg; no evidence of stress induced myocardial ischemia.  He is at low to moderate risk for a perioperative major adverse cardiac event during this moderate risk procedure.  Revised Cardiac Risk Index of 3.9%.  No further testing indicated.  OK to proceed with surgery.    Abnormal EKG- Pt is asymptomatic.  Exercise stress echo from 10/15/2018 showed normal LV systolic function with EF of 60-65%; indeterminate LV diastolic function; normal RV systolic function; estimated PA systolic pressure is 21 mmHg; no evidence of stress  induced myocardial ischemia.   HLD- Ten year ASCVD risk score elevated at 22%.  Start ASA 81 mg daily and atorvastatin 40 mg daily.      -Mr. Redd underwent left carpal tunnel release surgery on 9/2/2020 with no complications.      HPI:  Mr. Redd reports doing well with no chest pain, SOB, LE edema, TIA symptoms or syncope.  Labs from 11/16/2020 shows total LDL of 55 vs 109 on 10/7/2019.      Past Medical History:   Diagnosis Date    Allergy     Angio-edema     BPH (benign prostatic hyperplasia)     Chronic idiopathic urticaria 5/8/2017     Past Surgical History:   Procedure Laterality Date    ADENOIDECTOMY      HERNIA REPAIR      right shoulder replacement      SINUS SURGERY      rhinoplasty    SPINE SURGERY      Four lumbar spine surgeries    TONSILLECTOMY       Social History     Socioeconomic History    Marital status:      Spouse name: Not on file    Number of children: Not on file    Years of education: Not on file    Highest education level: Not on file   Occupational History    Not on file   Social Needs    Financial resource strain: Not hard at all    Food insecurity     Worry: Never true     Inability: Never true    Transportation needs     Medical: No     Non-medical: No   Tobacco Use    Smoking status: Former Smoker     Packs/day: 1.00     Years: 34.00     Pack years: 34.00     Types: Cigarettes    Smokeless tobacco: Former User     Quit date: 1/1/1980   Substance and Sexual Activity    Alcohol use: No     Frequency: Never     Drinks per session: Patient refused     Binge frequency: Patient refused    Drug use: No    Sexual activity: Yes     Partners: Female   Lifestyle    Physical activity     Days per week: 6 days     Minutes per session: 20 min    Stress: Not at all   Relationships    Social connections     Talks on phone: Twice a week     Gets together: Once a week     Attends Denominational service: Not on file     Active member of club or organization: Yes      Attends meetings of clubs or organizations: More than 4 times per year     Relationship status:    Other Topics Concern    Not on file   Social History Narrative    Not on file     Family History   Problem Relation Age of Onset    Suicide Sister     Lung cancer Father     Cancer Father     Cancer Mother     Tuberculosis Mother        Review of patient's allergies indicates:  No Known Allergies    Medication List with Changes/Refills   Current Medications    ATORVASTATIN (LIPITOR) 40 MG TABLET    Take 1 tablet (40 mg total) by mouth once daily.    CETIRIZINE (ZYRTEC) 10 MG TABLET    Take 10 mg by mouth every evening.     CYANOCOBALAMIN (VITAMIN B-12) 1000 MCG TABLET    Take 1,000 mcg by mouth once daily.     DOXAZOSIN (CARDURA) 4 MG TABLET    Take 4 mg by mouth once daily.     FLUZONE HIGHDOSE QUAD 20-21  MCG/0.7 ML SYRG    ADM 0.7ML IM UTD    GLUCOSAMINE-CHONDROITIN 500-400 MG TABLET    Take 1 tablet by mouth once daily.    IBUPROFEN (ADVIL,MOTRIN) 200 MG TABLET    Take 800 mg by mouth every 6 (six) hours as needed for Pain.    MELATONIN 1 MG TAB    Take 1 mg by mouth every evening.    MONTELUKAST (SINGULAIR) 10 MG TABLET    TAKE 1 TABLET EVERY EVENING    MULTIVITAMIN ORAL    Take by mouth.    NAPROXEN SODIUM (ALEVE) 220 MG CAP    Take by mouth. Patient takes Aleve PRN    TURMERIC, BULK, MISC    1,000 mg by Misc.(Non-Drug; Combo Route) route once daily.       Review of Systems  Constitution: Denies chills, fever, and sweats.  HENT: Denies headaches or blurry vision.  Cardiovascular: Denies chest pain or irregular heart beat.  Respiratory: Denies cough or shortness of breath.  Gastrointestinal: Denies abdominal pain, nausea, or vomiting.  Musculoskeletal: Denies muscle cramps.  Neurological: Denies dizziness or focal weakness.  Psychiatric/Behavioral: Normal mental status.  Hematologic/Lymphatic: Denies bleeding problem or easy bruising/bleeding.  Skin: Denies rash or suspicious  "lesions    Physical Examination  /60   Pulse 64   Ht 5' 11" (1.803 m)   Wt 82.7 kg (182 lb 5.1 oz)   BMI 25.43 kg/m²     Constitutional: No acute distress, conversant  HEENT: Sclera anicteric, Pupils equal, round and reactive to light, extraocular motions intact, Oropharynx clear  Neck: No JVD, no carotid bruits  Cardiovascular: regular rate and rhythm, no murmur, rubs or gallops, normal S1/S2  Pulmonary: Clear to auscultation bilaterally  Abdominal: Abdomen soft, nontender, nondistended, positive bowel sounds  Extremities: No lower extremity edema,   Pulses:  Carotid pulses are 2+ on the right side, and 2+ on the left side.  Radial pulses are 2+ on the right side, and 2+ on the left side.   Femoral pulses are 2+ on the right side, and 2+ on the left side.  Popliteal pulses are 2+ on the right side, and 2+ on the left side.   Dorsalis pedis pulses are 2+ on the right side, and 2+ on the left side.   Posterior tibial pulses are 2+ on the right side, and 2+ on the left side.    Skin: No ecchymosis, erythema, or ulcers  Psych: Alert and oriented x 3, appropriate affect  Neuro: CNII-XII intact, no focal deficits    Labs:  Most Recent Data  CBC:   Lab Results   Component Value Date    WBC 4.95 08/17/2020    HGB 12.8 (L) 08/17/2020    HCT 39.2 (L) 08/17/2020     08/17/2020    MCV 92 08/17/2020    RDW 13.1 08/17/2020     BMP:   Lab Results   Component Value Date     11/16/2020    K 4.0 11/16/2020     11/16/2020    CO2 28 11/16/2020    BUN 18 11/16/2020    CREATININE 0.9 11/16/2020    GLU 95 11/16/2020    CALCIUM 9.1 11/16/2020     LFTS;   Lab Results   Component Value Date    PROT 6.8 11/16/2020    ALBUMIN 4.0 11/16/2020    BILITOT 0.7 11/16/2020    AST 29 11/16/2020    ALKPHOS 80 11/16/2020    ALT 31 11/16/2020     COAGS: No results found for: INR, PROTIME, PTT  FLP:   Lab Results   Component Value Date    CHOL 117 (L) 11/16/2020    HDL 53 11/16/2020    LDLCALC 54.6 (L) 11/16/2020    TRIG 47 " 11/16/2020    CHOLHDL 45.3 11/16/2020     CARDIAC: No results found for: TROPONINI, CKMB, BNP      EKG 8/17/2020:  Normal sinus rhythm  Nonspecific ST/T wave changes    Exercise Stress Echo 10/15/2018:  CONCLUSIONS     1 - Normal left ventricular systolic function (EF 60-65%).     2 - No wall motion abnormalities.     3 - Indeterminate LV diastolic function.     4 - Normal right ventricular systolic function .     5 - The estimated PA systolic pressure is 21 mmHg.     No evidence of stress induced myocardial ischemia.       Assessment/Plan:  Kvng Redd is a 75 y.o. male with a past medical history of angio-edema, former smoker, who presents for a follow up appointment.     1.  Preoperative Cardiac Risk Stratification Prior to Planned Bilateral Carpal tunnel Release Surgery- Mr. Redd has no chest pain, no heart failure symptoms, and no documented arrhythmia(s).  He can perform greater than 4 METS with no difficulty.  EKG from 8/17/2020 shows normal sinus rhythm with nonspecific ST/T wave changes.  Exercise stress echo from 10/15/2018 showed normal LV systolic function with EF of 60-65%; indeterminate LV diastolic function; normal RV systolic function; estimated PA systolic pressure is 21 mmHg; no evidence of stress induced myocardial ischemia.  He is at low to moderate risk for a perioperative major adverse cardiac event during this moderate risk procedure.  Revised Cardiac Risk Index of 3.9%.  No further testing indicated.  OK to proceed with surgery.      2. Abnormal EKG- Pt is asymptomatic.  Exercise stress echo from 10/15/2018 showed normal LV systolic function with EF of 60-65%; indeterminate LV diastolic function; normal RV systolic function; estimated PA systolic pressure is 21 mmHg; no evidence of stress induced myocardial ischemia.     3. HLD- Ten year ASCVD risk score elevated at 22%.   Labs from 11/16/2020 shows total LDL of 55 vs 109 on 10/7/2019.  LFT's within normal limits.  Continue ASA 81 mg  daily and atorvastatin 40 mg daily.      Follow up in 6 months with lipids and carotid ultrasound prior    Total duration of face to face visit time 30 minutes.  Total time spent counseling greater than fifty percent of total visit time.  Counseling included discussion regarding imaging findings, diagnosis, possibilities, treatment options, risks and benefits.  The patient had many questions regarding the options and long-term effects.    Hemanth Gerardo MD, PhD  Interventional Cardiology

## 2021-01-03 ENCOUNTER — PATIENT MESSAGE (OUTPATIENT)
Dept: ADMINISTRATIVE | Facility: OTHER | Age: 76
End: 2021-01-03

## 2021-01-15 ENCOUNTER — IMMUNIZATION (OUTPATIENT)
Dept: INTERNAL MEDICINE | Facility: CLINIC | Age: 76
End: 2021-01-15
Payer: MEDICARE

## 2021-01-15 DIAGNOSIS — Z23 NEED FOR VACCINATION: Primary | ICD-10-CM

## 2021-01-15 PROCEDURE — 91300 COVID-19, MRNA, LNP-S, PF, 30 MCG/0.3 ML DOSE VACCINE: CPT | Mod: PBBFAC | Performed by: INTERNAL MEDICINE

## 2021-02-05 ENCOUNTER — IMMUNIZATION (OUTPATIENT)
Dept: INTERNAL MEDICINE | Facility: CLINIC | Age: 76
End: 2021-02-05
Payer: MEDICARE

## 2021-02-05 DIAGNOSIS — Z23 NEED FOR VACCINATION: Primary | ICD-10-CM

## 2021-02-05 PROCEDURE — 0002A COVID-19, MRNA, LNP-S, PF, 30 MCG/0.3 ML DOSE VACCINE: CPT | Mod: PBBFAC | Performed by: INTERNAL MEDICINE

## 2021-02-05 PROCEDURE — 91300 COVID-19, MRNA, LNP-S, PF, 30 MCG/0.3 ML DOSE VACCINE: CPT | Mod: PBBFAC | Performed by: INTERNAL MEDICINE

## 2021-03-04 ENCOUNTER — PATIENT MESSAGE (OUTPATIENT)
Dept: CARDIOLOGY | Facility: CLINIC | Age: 76
End: 2021-03-04

## 2021-03-18 ENCOUNTER — PATIENT MESSAGE (OUTPATIENT)
Dept: RESEARCH | Facility: HOSPITAL | Age: 76
End: 2021-03-18

## 2021-03-26 ENCOUNTER — PATIENT MESSAGE (OUTPATIENT)
Dept: RESEARCH | Facility: HOSPITAL | Age: 76
End: 2021-03-26

## 2021-05-31 ENCOUNTER — HOSPITAL ENCOUNTER (OUTPATIENT)
Dept: CARDIOLOGY | Facility: HOSPITAL | Age: 76
Discharge: HOME | End: 2021-05-31
Attending: INTERNAL MEDICINE
Payer: MEDICARE

## 2021-05-31 DIAGNOSIS — I67.89 OTHER CEREBROVASCULAR DISEASE: ICD-10-CM

## 2021-05-31 DIAGNOSIS — Z87.891 FORMER SMOKER: ICD-10-CM

## 2021-05-31 LAB
LEFT ARM DIASTOLIC BLOOD PRESSURE: 60 MMHG
LEFT ARM SYSTOLIC BLOOD PRESSURE: 130 MMHG
LEFT CBA DIAS: 14 CM/S
LEFT CBA SYS: 75 CM/S
LEFT CCA DIST DIAS: 14 CM/S
LEFT CCA DIST SYS: 75 CM/S
LEFT CCA MID DIAS: 15 CM/S
LEFT CCA MID SYS: 106 CM/S
LEFT CCA PROX DIAS: 16 CM/S
LEFT CCA PROX SYS: 92 CM/S
LEFT ECA DIAS: 16 CM/S
LEFT ECA SYS: 80 CM/S
LEFT ICA DIST DIAS: 21 CM/S
LEFT ICA DIST SYS: 77 CM/S
LEFT ICA MID DIAS: 19 CM/S
LEFT ICA MID SYS: 67 CM/S
LEFT ICA PROX DIAS: 14 CM/S
LEFT ICA PROX SYS: 70 CM/S
LEFT VERTEBRAL DIAS: 12 CM/S
LEFT VERTEBRAL SYS: 59 CM/S
OHS CV CAROTID RIGHT ICA EDV HIGHEST: 30
OHS CV CAROTID ULTRASOUND LEFT ICA/CCA RATIO: 1.03
OHS CV CAROTID ULTRASOUND RIGHT ICA/CCA RATIO: 1.85
OHS CV PV CAROTID LEFT HIGHEST CCA: 106
OHS CV PV CAROTID LEFT HIGHEST ICA: 77
OHS CV PV CAROTID RIGHT HIGHEST CCA: 110
OHS CV PV CAROTID RIGHT HIGHEST ICA: 120
OHS CV US CAROTID LEFT HIGHEST EDV: 21
RIGHT ARM DIASTOLIC BLOOD PRESSURE: 60 MMHG
RIGHT ARM SYSTOLIC BLOOD PRESSURE: 120 MMHG
RIGHT CBA DIAS: 41 CM/S
RIGHT CBA SYS: 140 CM/S
RIGHT CCA DIST DIAS: 12 CM/S
RIGHT CCA DIST SYS: 65 CM/S
RIGHT CCA MID DIAS: 14 CM/S
RIGHT CCA MID SYS: 84 CM/S
RIGHT CCA PROX DIAS: 13 CM/S
RIGHT CCA PROX SYS: 110 CM/S
RIGHT ECA DIAS: 10 CM/S
RIGHT ECA SYS: 90 CM/S
RIGHT ICA DIST DIAS: 25 CM/S
RIGHT ICA DIST SYS: 120 CM/S
RIGHT ICA MID DIAS: 30 CM/S
RIGHT ICA MID SYS: 118 CM/S
RIGHT ICA PROX DIAS: 23 CM/S
RIGHT ICA PROX SYS: 117 CM/S
RIGHT VERTEBRAL DIAS: 9 CM/S
RIGHT VERTEBRAL SYS: 47 CM/S

## 2021-05-31 PROCEDURE — 93880 CV US DOPPLER CAROTID (CUPID ONLY): ICD-10-PCS | Mod: 26,,, | Performed by: INTERNAL MEDICINE

## 2021-05-31 PROCEDURE — 93880 EXTRACRANIAL BILAT STUDY: CPT

## 2021-05-31 PROCEDURE — 93880 EXTRACRANIAL BILAT STUDY: CPT | Mod: 26,,, | Performed by: INTERNAL MEDICINE

## 2021-06-02 ENCOUNTER — OFFICE VISIT (OUTPATIENT)
Dept: CARDIOLOGY | Facility: CLINIC | Age: 76
End: 2021-06-02
Payer: MEDICARE

## 2021-06-02 VITALS
HEART RATE: 59 BPM | HEIGHT: 72 IN | WEIGHT: 182.75 LBS | SYSTOLIC BLOOD PRESSURE: 134 MMHG | DIASTOLIC BLOOD PRESSURE: 60 MMHG | BODY MASS INDEX: 24.75 KG/M2

## 2021-06-02 DIAGNOSIS — Z01.810 PREOPERATIVE CARDIOVASCULAR EXAMINATION: ICD-10-CM

## 2021-06-02 DIAGNOSIS — E78.2 MIXED HYPERLIPIDEMIA: ICD-10-CM

## 2021-06-02 DIAGNOSIS — Z87.891 FORMER SMOKER: Primary | ICD-10-CM

## 2021-06-02 DIAGNOSIS — I65.23 BILATERAL CAROTID ARTERY STENOSIS: ICD-10-CM

## 2021-06-02 PROCEDURE — 99499 RISK ADDL DX/OHS AUDIT: ICD-10-PCS | Mod: S$GLB,,, | Performed by: INTERNAL MEDICINE

## 2021-06-02 PROCEDURE — 99215 PR OFFICE/OUTPT VISIT, EST, LEVL V, 40-54 MIN: ICD-10-PCS | Mod: S$GLB,,, | Performed by: INTERNAL MEDICINE

## 2021-06-02 PROCEDURE — 99999 PR PBB SHADOW E&M-EST. PATIENT-LVL IV: CPT | Mod: PBBFAC,,, | Performed by: INTERNAL MEDICINE

## 2021-06-02 PROCEDURE — 99999 PR PBB SHADOW E&M-EST. PATIENT-LVL IV: ICD-10-PCS | Mod: PBBFAC,,, | Performed by: INTERNAL MEDICINE

## 2021-06-02 PROCEDURE — 1101F PT FALLS ASSESS-DOCD LE1/YR: CPT | Mod: CPTII,S$GLB,, | Performed by: INTERNAL MEDICINE

## 2021-06-02 PROCEDURE — 1126F PR PAIN SEVERITY QUANTIFIED, NO PAIN PRESENT: ICD-10-PCS | Mod: S$GLB,,, | Performed by: INTERNAL MEDICINE

## 2021-06-02 PROCEDURE — 1159F PR MEDICATION LIST DOCUMENTED IN MEDICAL RECORD: ICD-10-PCS | Mod: S$GLB,,, | Performed by: INTERNAL MEDICINE

## 2021-06-02 PROCEDURE — 3288F FALL RISK ASSESSMENT DOCD: CPT | Mod: CPTII,S$GLB,, | Performed by: INTERNAL MEDICINE

## 2021-06-02 PROCEDURE — 1159F MED LIST DOCD IN RCRD: CPT | Mod: S$GLB,,, | Performed by: INTERNAL MEDICINE

## 2021-06-02 PROCEDURE — 1126F AMNT PAIN NOTED NONE PRSNT: CPT | Mod: S$GLB,,, | Performed by: INTERNAL MEDICINE

## 2021-06-02 PROCEDURE — 3288F PR FALLS RISK ASSESSMENT DOCUMENTED: ICD-10-PCS | Mod: CPTII,S$GLB,, | Performed by: INTERNAL MEDICINE

## 2021-06-02 PROCEDURE — 99499 UNLISTED E&M SERVICE: CPT | Mod: S$GLB,,, | Performed by: INTERNAL MEDICINE

## 2021-06-02 PROCEDURE — 99215 OFFICE O/P EST HI 40 MIN: CPT | Mod: S$GLB,,, | Performed by: INTERNAL MEDICINE

## 2021-06-02 PROCEDURE — 1101F PR PT FALLS ASSESS DOC 0-1 FALLS W/OUT INJ PAST YR: ICD-10-PCS | Mod: CPTII,S$GLB,, | Performed by: INTERNAL MEDICINE

## 2021-07-12 NOTE — TELEPHONE ENCOUNTER
----- Message from Laverne Rocha sent at 6/5/2018 12:53 PM CDT -----  Contact: wife/ [phone]  Patient would like to know if her medical records from Dr. Rajan has been received? Please call and advise.     Patient informed of Dr. Colon's recommendations via [employer] message. Prescription pended, need preferred pharmacy.

## 2021-08-17 ENCOUNTER — PES CALL (OUTPATIENT)
Dept: ADMINISTRATIVE | Facility: CLINIC | Age: 76
End: 2021-08-17

## 2021-08-23 ENCOUNTER — IMMUNIZATION (OUTPATIENT)
Dept: INTERNAL MEDICINE | Facility: CLINIC | Age: 76
End: 2021-08-23
Payer: MEDICARE

## 2021-08-23 DIAGNOSIS — Z23 NEED FOR VACCINATION: Primary | ICD-10-CM

## 2021-08-23 PROCEDURE — 91300 COVID-19, MRNA, LNP-S, PF, 30 MCG/0.3 ML DOSE VACCINE: CPT | Mod: ,,, | Performed by: INTERNAL MEDICINE

## 2021-08-23 PROCEDURE — 0003A COVID-19, MRNA, LNP-S, PF, 30 MCG/0.3 ML DOSE VACCINE: ICD-10-PCS | Mod: CV19,,, | Performed by: INTERNAL MEDICINE

## 2021-08-23 PROCEDURE — 0003A COVID-19, MRNA, LNP-S, PF, 30 MCG/0.3 ML DOSE VACCINE: CPT | Mod: CV19,,, | Performed by: INTERNAL MEDICINE

## 2021-08-23 PROCEDURE — 91300 COVID-19, MRNA, LNP-S, PF, 30 MCG/0.3 ML DOSE VACCINE: ICD-10-PCS | Mod: ,,, | Performed by: INTERNAL MEDICINE

## 2021-10-04 ENCOUNTER — PATIENT MESSAGE (OUTPATIENT)
Dept: ADMINISTRATIVE | Facility: HOSPITAL | Age: 76
End: 2021-10-04

## 2021-11-03 ENCOUNTER — TELEPHONE (OUTPATIENT)
Dept: INTERNAL MEDICINE | Facility: CLINIC | Age: 76
End: 2021-11-03
Payer: MEDICARE

## 2021-11-03 DIAGNOSIS — R35.1 NOCTURIA: ICD-10-CM

## 2021-11-03 DIAGNOSIS — E78.2 MIXED HYPERLIPIDEMIA: Primary | ICD-10-CM

## 2021-11-03 DIAGNOSIS — Z00.00 PREVENTATIVE HEALTH CARE: ICD-10-CM

## 2022-01-10 ENCOUNTER — LAB VISIT (OUTPATIENT)
Dept: LAB | Facility: HOSPITAL | Age: 77
End: 2022-01-10
Attending: INTERNAL MEDICINE
Payer: MEDICARE

## 2022-01-10 DIAGNOSIS — E78.2 MIXED HYPERLIPIDEMIA: ICD-10-CM

## 2022-01-10 DIAGNOSIS — R35.1 NOCTURIA: ICD-10-CM

## 2022-01-10 DIAGNOSIS — Z00.00 PREVENTATIVE HEALTH CARE: ICD-10-CM

## 2022-01-10 LAB
ALBUMIN SERPL BCP-MCNC: 4 G/DL (ref 3.5–5.2)
ALP SERPL-CCNC: 82 U/L (ref 55–135)
ALT SERPL W/O P-5'-P-CCNC: 37 U/L (ref 10–44)
ANION GAP SERPL CALC-SCNC: 7 MMOL/L (ref 8–16)
AST SERPL-CCNC: 32 U/L (ref 10–40)
BASOPHILS # BLD AUTO: 0.03 K/UL (ref 0–0.2)
BASOPHILS NFR BLD: 0.5 % (ref 0–1.9)
BILIRUB SERPL-MCNC: 0.6 MG/DL (ref 0.1–1)
BUN SERPL-MCNC: 14 MG/DL (ref 8–23)
CALCIUM SERPL-MCNC: 9.5 MG/DL (ref 8.7–10.5)
CHLORIDE SERPL-SCNC: 105 MMOL/L (ref 95–110)
CHOLEST SERPL-MCNC: 128 MG/DL (ref 120–199)
CHOLEST/HDLC SERPL: 2.3 {RATIO} (ref 2–5)
CO2 SERPL-SCNC: 26 MMOL/L (ref 23–29)
COMPLEXED PSA SERPL-MCNC: 1.9 NG/ML (ref 0–4)
CREAT SERPL-MCNC: 0.9 MG/DL (ref 0.5–1.4)
DIFFERENTIAL METHOD: ABNORMAL
EOSINOPHIL # BLD AUTO: 0.4 K/UL (ref 0–0.5)
EOSINOPHIL NFR BLD: 6.3 % (ref 0–8)
ERYTHROCYTE [DISTWIDTH] IN BLOOD BY AUTOMATED COUNT: 13.2 % (ref 11.5–14.5)
EST. GFR  (AFRICAN AMERICAN): >60 ML/MIN/1.73 M^2
EST. GFR  (NON AFRICAN AMERICAN): >60 ML/MIN/1.73 M^2
GLUCOSE SERPL-MCNC: 96 MG/DL (ref 70–110)
HCT VFR BLD AUTO: 40.8 % (ref 40–54)
HDLC SERPL-MCNC: 56 MG/DL (ref 40–75)
HDLC SERPL: 43.8 % (ref 20–50)
HGB BLD-MCNC: 13.5 G/DL (ref 14–18)
IMM GRANULOCYTES # BLD AUTO: 0.01 K/UL (ref 0–0.04)
IMM GRANULOCYTES NFR BLD AUTO: 0.2 % (ref 0–0.5)
LDLC SERPL CALC-MCNC: 58.8 MG/DL (ref 63–159)
LYMPHOCYTES # BLD AUTO: 1.5 K/UL (ref 1–4.8)
LYMPHOCYTES NFR BLD: 25.2 % (ref 18–48)
MCH RBC QN AUTO: 29.6 PG (ref 27–31)
MCHC RBC AUTO-ENTMCNC: 33.1 G/DL (ref 32–36)
MCV RBC AUTO: 90 FL (ref 82–98)
MONOCYTES # BLD AUTO: 0.6 K/UL (ref 0.3–1)
MONOCYTES NFR BLD: 9.8 % (ref 4–15)
NEUTROPHILS # BLD AUTO: 3.4 K/UL (ref 1.8–7.7)
NEUTROPHILS NFR BLD: 58 % (ref 38–73)
NONHDLC SERPL-MCNC: 72 MG/DL
NRBC BLD-RTO: 0 /100 WBC
PLATELET # BLD AUTO: 218 K/UL (ref 150–450)
PMV BLD AUTO: 9.9 FL (ref 9.2–12.9)
POTASSIUM SERPL-SCNC: 4.1 MMOL/L (ref 3.5–5.1)
PROT SERPL-MCNC: 7.1 G/DL (ref 6–8.4)
RBC # BLD AUTO: 4.56 M/UL (ref 4.6–6.2)
SODIUM SERPL-SCNC: 138 MMOL/L (ref 136–145)
TRIGL SERPL-MCNC: 66 MG/DL (ref 30–150)
TSH SERPL DL<=0.005 MIU/L-ACNC: 1.68 UIU/ML (ref 0.4–4)
WBC # BLD AUTO: 5.91 K/UL (ref 3.9–12.7)

## 2022-01-10 PROCEDURE — 80053 COMPREHEN METABOLIC PANEL: CPT | Mod: HCNC | Performed by: INTERNAL MEDICINE

## 2022-01-10 PROCEDURE — 84153 ASSAY OF PSA TOTAL: CPT | Mod: HCNC | Performed by: INTERNAL MEDICINE

## 2022-01-10 PROCEDURE — 84443 ASSAY THYROID STIM HORMONE: CPT | Mod: HCNC | Performed by: INTERNAL MEDICINE

## 2022-01-10 PROCEDURE — 85025 COMPLETE CBC W/AUTO DIFF WBC: CPT | Mod: HCNC | Performed by: INTERNAL MEDICINE

## 2022-01-10 PROCEDURE — 36415 COLL VENOUS BLD VENIPUNCTURE: CPT | Mod: HCNC,PO | Performed by: INTERNAL MEDICINE

## 2022-01-10 PROCEDURE — 80061 LIPID PANEL: CPT | Mod: HCNC | Performed by: INTERNAL MEDICINE

## 2022-01-20 ENCOUNTER — OFFICE VISIT (OUTPATIENT)
Dept: ALLERGY | Facility: CLINIC | Age: 77
End: 2022-01-20
Payer: MEDICARE

## 2022-01-20 ENCOUNTER — OFFICE VISIT (OUTPATIENT)
Dept: INTERNAL MEDICINE | Facility: CLINIC | Age: 77
End: 2022-01-20
Payer: MEDICARE

## 2022-01-20 VITALS
HEART RATE: 79 BPM | OXYGEN SATURATION: 96 % | RESPIRATION RATE: 18 BRPM | HEIGHT: 71 IN | SYSTOLIC BLOOD PRESSURE: 112 MMHG | WEIGHT: 183.88 LBS | BODY MASS INDEX: 25.74 KG/M2 | DIASTOLIC BLOOD PRESSURE: 62 MMHG | TEMPERATURE: 98 F

## 2022-01-20 VITALS — BODY MASS INDEX: 25.96 KG/M2 | HEIGHT: 71 IN | WEIGHT: 185.44 LBS

## 2022-01-20 DIAGNOSIS — T78.3XXD ANGIOEDEMA, SUBSEQUENT ENCOUNTER: Primary | ICD-10-CM

## 2022-01-20 DIAGNOSIS — Z00.00 ANNUAL PHYSICAL EXAM: Primary | ICD-10-CM

## 2022-01-20 DIAGNOSIS — N40.0 BENIGN PROSTATIC HYPERPLASIA, UNSPECIFIED WHETHER LOWER URINARY TRACT SYMPTOMS PRESENT: ICD-10-CM

## 2022-01-20 DIAGNOSIS — L29.9 PRURITUS: ICD-10-CM

## 2022-01-20 DIAGNOSIS — L50.8 CHRONIC URTICARIA: ICD-10-CM

## 2022-01-20 DIAGNOSIS — J30.89 ALLERGIC RHINITIS DUE TO DUST MITE: ICD-10-CM

## 2022-01-20 PROCEDURE — 1160F PR REVIEW ALL MEDS BY PRESCRIBER/CLIN PHARMACIST DOCUMENTED: ICD-10-PCS | Mod: HCNC,CPTII,S$GLB, | Performed by: ALLERGY & IMMUNOLOGY

## 2022-01-20 PROCEDURE — 1101F PT FALLS ASSESS-DOCD LE1/YR: CPT | Mod: HCNC,CPTII,S$GLB, | Performed by: INTERNAL MEDICINE

## 2022-01-20 PROCEDURE — 99999 PR PBB SHADOW E&M-EST. PATIENT-LVL III: ICD-10-PCS | Mod: PBBFAC,HCNC,, | Performed by: ALLERGY & IMMUNOLOGY

## 2022-01-20 PROCEDURE — 3078F PR MOST RECENT DIASTOLIC BLOOD PRESSURE < 80 MM HG: ICD-10-PCS | Mod: HCNC,CPTII,S$GLB, | Performed by: INTERNAL MEDICINE

## 2022-01-20 PROCEDURE — 1159F PR MEDICATION LIST DOCUMENTED IN MEDICAL RECORD: ICD-10-PCS | Mod: HCNC,CPTII,S$GLB, | Performed by: INTERNAL MEDICINE

## 2022-01-20 PROCEDURE — 1126F PR PAIN SEVERITY QUANTIFIED, NO PAIN PRESENT: ICD-10-PCS | Mod: HCNC,CPTII,S$GLB, | Performed by: INTERNAL MEDICINE

## 2022-01-20 PROCEDURE — 1159F PR MEDICATION LIST DOCUMENTED IN MEDICAL RECORD: ICD-10-PCS | Mod: HCNC,CPTII,S$GLB, | Performed by: ALLERGY & IMMUNOLOGY

## 2022-01-20 PROCEDURE — 1160F RVW MEDS BY RX/DR IN RCRD: CPT | Mod: HCNC,CPTII,S$GLB, | Performed by: ALLERGY & IMMUNOLOGY

## 2022-01-20 PROCEDURE — 99999 PR PBB SHADOW E&M-EST. PATIENT-LVL III: CPT | Mod: PBBFAC,HCNC,, | Performed by: ALLERGY & IMMUNOLOGY

## 2022-01-20 PROCEDURE — 99214 PR OFFICE/OUTPT VISIT, EST, LEVL IV, 30-39 MIN: ICD-10-PCS | Mod: HCNC,S$GLB,, | Performed by: ALLERGY & IMMUNOLOGY

## 2022-01-20 PROCEDURE — 1126F PR PAIN SEVERITY QUANTIFIED, NO PAIN PRESENT: ICD-10-PCS | Mod: HCNC,CPTII,S$GLB, | Performed by: ALLERGY & IMMUNOLOGY

## 2022-01-20 PROCEDURE — 3078F DIAST BP <80 MM HG: CPT | Mod: HCNC,CPTII,S$GLB, | Performed by: INTERNAL MEDICINE

## 2022-01-20 PROCEDURE — 99397 PR PREVENTIVE VISIT,EST,65 & OVER: ICD-10-PCS | Mod: HCNC,S$GLB,, | Performed by: INTERNAL MEDICINE

## 2022-01-20 PROCEDURE — 1126F AMNT PAIN NOTED NONE PRSNT: CPT | Mod: HCNC,CPTII,S$GLB, | Performed by: INTERNAL MEDICINE

## 2022-01-20 PROCEDURE — 99214 OFFICE O/P EST MOD 30 MIN: CPT | Mod: HCNC,S$GLB,, | Performed by: ALLERGY & IMMUNOLOGY

## 2022-01-20 PROCEDURE — 1126F AMNT PAIN NOTED NONE PRSNT: CPT | Mod: HCNC,CPTII,S$GLB, | Performed by: ALLERGY & IMMUNOLOGY

## 2022-01-20 PROCEDURE — 3288F PR FALLS RISK ASSESSMENT DOCUMENTED: ICD-10-PCS | Mod: HCNC,CPTII,S$GLB, | Performed by: INTERNAL MEDICINE

## 2022-01-20 PROCEDURE — 1101F PR PT FALLS ASSESS DOC 0-1 FALLS W/OUT INJ PAST YR: ICD-10-PCS | Mod: HCNC,CPTII,S$GLB, | Performed by: INTERNAL MEDICINE

## 2022-01-20 PROCEDURE — 1159F MED LIST DOCD IN RCRD: CPT | Mod: HCNC,CPTII,S$GLB, | Performed by: INTERNAL MEDICINE

## 2022-01-20 PROCEDURE — 99999 PR PBB SHADOW E&M-EST. PATIENT-LVL III: ICD-10-PCS | Mod: PBBFAC,HCNC,, | Performed by: INTERNAL MEDICINE

## 2022-01-20 PROCEDURE — 99999 PR PBB SHADOW E&M-EST. PATIENT-LVL III: CPT | Mod: PBBFAC,HCNC,, | Performed by: INTERNAL MEDICINE

## 2022-01-20 PROCEDURE — 3074F SYST BP LT 130 MM HG: CPT | Mod: HCNC,CPTII,S$GLB, | Performed by: INTERNAL MEDICINE

## 2022-01-20 PROCEDURE — 3288F FALL RISK ASSESSMENT DOCD: CPT | Mod: HCNC,CPTII,S$GLB, | Performed by: INTERNAL MEDICINE

## 2022-01-20 PROCEDURE — 1159F MED LIST DOCD IN RCRD: CPT | Mod: HCNC,CPTII,S$GLB, | Performed by: ALLERGY & IMMUNOLOGY

## 2022-01-20 PROCEDURE — 99397 PER PM REEVAL EST PAT 65+ YR: CPT | Mod: HCNC,S$GLB,, | Performed by: INTERNAL MEDICINE

## 2022-01-20 PROCEDURE — 3074F PR MOST RECENT SYSTOLIC BLOOD PRESSURE < 130 MM HG: ICD-10-PCS | Mod: HCNC,CPTII,S$GLB, | Performed by: INTERNAL MEDICINE

## 2022-01-20 RX ORDER — MONTELUKAST SODIUM 10 MG/1
10 TABLET ORAL NIGHTLY
Qty: 90 TABLET | Refills: 4 | Status: SHIPPED | OUTPATIENT
Start: 2022-01-20 | End: 2023-03-27

## 2022-01-20 RX ORDER — CETIRIZINE HYDROCHLORIDE 10 MG/1
10 TABLET ORAL NIGHTLY
Qty: 90 TABLET | Refills: 4 | Status: SHIPPED | OUTPATIENT
Start: 2022-01-20

## 2022-01-20 NOTE — PROGRESS NOTES
Chief Complaint: No chief complaint on file.        Subjective:         HPI    Kvng Redd is a 76 y.o. male here for continued evaluation of angioedema, allergic rhinitis and prior history of hives. He was last seen 8/11/2020. He states this started in 2016. He saw [facility] and had labs and all normal. Then had skin test inhalants positive to dust mites and skin test foods all negative. 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 He takes montelukast 10 mg and cetirizine 10 mg every night (weaned down from 20 last year and still controlled). This keeps it controlled. He no longer feels tingling in lips and never swells. Prior to meds had frequent lip, eye and facial swelling. He denies hives but was reported in prior notes. He has not missed meds and is well controlled. He has no rhinitis on this regimen, no congestion, runny nose or sneeze. He used to get sneeze and runny nose often, feels zyrtec controls it. No cough SOB or wheeze.          Review of Systems  Constitutional: Negative for changes in appetite, unintentional weight loss, fever, chills and fatigue.   HENT: Negative for facial pain, nose bleeds, nasal congestion, postnasal drip, throat clearing, sinus pressure, and voice change. Negative for ear discharge, ear pain, facial swelling, sore throat and trouble swallowing.  Eyes: Negative for occular discharge, redness, itching and visual disturbance.  Respiratory: Negative for chest tightness, shortness of breath, wheezing, dyspnea on exertion, sputum production and cough.   Cardiovascular: Negative for chest pain, palpitations and leg swelling.  Gastrointestinal: Negative for abdominal distension, abdominal pain, constipation, diarrhea, nausea,and vomiting.   Genitourinary: Negative for difficulty urinating.   Musculoskeletal: Negative for arthralgias, gait problems, joint swelling, myalgias and back pain.   Neurological: Negative for dizziness, syncope, weakness, light-headedness, and headaches.    Hematological: Negative for adenopathy, does not bruise or bleed easily.  Psychiatric/Behavioral: Negative for agitation, anxiety, behavioral problems, confusion, and insomnia.  Skin: Positive for angioedema as above.     PMH:  Reviewed with the patient and current for this visit    Family History:  Reviewed with the patient and current for this visit    Social History:  Reviewed with the patient and current for this visit     Environmental History:  Reviewed with the patient and current for this visit          Objective:      Skin Test results: Positive to house dust mites; test was prick skin test done on 4/28/2016.  Skin test for foods done on the same day was negative.The positive histamine control (3+) and the negative saline control (0), indicate that this was a valid assessment of allergic recognition.     Immunotherapy: Patient has not been on allergen specific immunotherapy     Physical Exam  General:patient is well developed and well nourished, in no acute distress.  Mental Status:  Alert, oriented and cooperative  Head and Face: normocephalic   Allergic shiners: No  Eyes:   Pupils: ERRLA: Scleral conjunctiva: clear; Cornea: clear; Palprebal conjunctiva: normal: Eyelid Skin: normal  Ears:Tympanic membrane Left:intact and normal light reflex; Right:intact and normal light reflex; External canals normal bilaterally.  Nose:  Nares: patent; Mucosa :pink and moist; Nasal septum: midline;Turbinates are of normal size bilaterally and do not occlude the nasal airway.  Mouth/Pharynx: tonsils present; posterior pharyngeal wall normal; tongue normal; teeth normal; voice quality normal.  Neck:  Cervical lymph nodes: small, non-tender, freely moveable both anterior and posterior cervical chain; Trachea: midline; Masses: none  Lungs: Air movement is good; respiratory effort is good; no respiratory distress; breath sounds are vesicular in all lung fields; no wheezing; normal expiratory time; no cough.  Heart: regular  rate and rhythm with mild respiratory variation; A1 and P2 are normal; no murmurs or gallops.  Abdomen:exam not done  Extremities: no cyanosis, clubbing or edema  Skin:no rashes or lesions present; skin hydrated and supple.         Assessment:       1. Angioedema, subsequent encounter     2. Chronic urticaria     3. Allergic rhinitis due to dust mite     4. Pruritus             Plan:         1. continue cetirizine 10 mg nightly.  2. As doing well try to hold montelukast, if swelling occurs or if rhinitis flares thence resume  3. benadryl as needed if flares  4. RTC annually or sooner if needed        Ya Bettencourt MD

## 2022-01-20 NOTE — PROGRESS NOTES
Subjective:       Patient ID: Kvng Redd is a 76 y.o. male.    Chief Complaint: Annual Exam (Labs 1/10/22)    HPI   76 y.o. Male here for annual exam.      Vaccines: Influenza (2021); Tetanus (2018); Pneumovax (current); Shingrix (2019)  Eye exam: 2018  Colonoscopy: 6/19  DEXA: declined     Exercise: walks  Diet: regular     Past Medical History:  No date: Allergy  No date: Angio-edema  No date: BPH (benign prostatic hyperplasia)  5/8/2017: Chronic idiopathic urticaria  Past Surgical History:  No date: ADENOIDECTOMY  No date: HERNIA REPAIR  No date: right shoulder replacement  No date: SINUS SURGERY      Comment:  rhinoplasty  No date: SPINE SURGERY  No date: TONSILLECTOMY  No date: B/L Carpal tunnel release   Social History    Socioeconomic History      Marital status:       Spouse name: Not on file      Number of children: Not on file      Years of education: Not on file      Highest education level: Not on file    Social Needs      Financial resource strain: Not on file      Food insecurity - worry: Not on file      Food insecurity - inability: Not on file      Transportation needs - medical: Not on file      Transportation needs - non-medical: Not on file    Occupational History      Hairdresser     Tobacco Use      Smoking status: Former Smoker        Packs/day: 1.00        Years: 34.00        Pack years: 34        Types: Cigarettes      Smokeless tobacco: Former User        Quit date: 1/1/1980    Substance and Sexual Activity      Alcohol use: No      Drug use: No      Sexual activity: Yes        Partners: Female     Review of patient's allergies indicates:  No Known Allergies  Review of Systems   Constitutional: Negative for activity change, appetite change, chills, diaphoresis, fatigue, fever and unexpected weight change.   HENT: Negative for nasal congestion, mouth sores, postnasal drip, rhinorrhea, sinus pressure/congestion, sneezing, sore throat, trouble swallowing and voice change.    Eyes:  Negative for discharge, itching and visual disturbance.   Respiratory: Negative for cough, chest tightness, shortness of breath and wheezing.    Cardiovascular: Negative for chest pain, palpitations and leg swelling.   Gastrointestinal: Negative for abdominal pain, blood in stool, constipation, diarrhea, nausea and vomiting.   Endocrine: Negative for cold intolerance and heat intolerance.   Genitourinary: Negative for difficulty urinating, dysuria, flank pain, hematuria and urgency.   Musculoskeletal: Negative for arthralgias, back pain, myalgias and neck pain.   Integumentary:  Negative for rash and wound.   Allergic/Immunologic: Negative for environmental allergies and food allergies.   Neurological: Negative for dizziness, tremors, seizures, syncope, weakness and headaches.   Hematological: Negative for adenopathy. Does not bruise/bleed easily.   Psychiatric/Behavioral: Negative for confusion, sleep disturbance and suicidal ideas. The patient is not nervous/anxious.          Objective:      Physical Exam  Constitutional:       General: He is not in acute distress.     Appearance: He is well-developed and well-nourished. He is not diaphoretic.   HENT:      Head: Normocephalic and atraumatic.      Right Ear: External ear normal.      Left Ear: External ear normal.      Nose: Nose normal.      Mouth/Throat:      Mouth: Oropharynx is clear and moist.      Pharynx: No oropharyngeal exudate.   Eyes:      General: No scleral icterus.        Right eye: No discharge.         Left eye: No discharge.      Extraocular Movements: EOM normal.      Conjunctiva/sclera: Conjunctivae normal.      Pupils: Pupils are equal, round, and reactive to light.   Neck:      Thyroid: No thyromegaly.      Vascular: No JVD.   Cardiovascular:      Rate and Rhythm: Normal rate and regular rhythm.      Pulses: Intact distal pulses.      Heart sounds: Normal heart sounds. No murmur heard.      Pulmonary:      Effort: Pulmonary effort is normal. No  respiratory distress.      Breath sounds: Normal breath sounds. No wheezing or rales.   Abdominal:      General: Bowel sounds are normal. There is no distension.      Palpations: Abdomen is soft.      Tenderness: There is no abdominal tenderness. There is no guarding.   Musculoskeletal:         General: No edema.      Cervical back: Normal range of motion and neck supple.   Lymphadenopathy:      Cervical: No cervical adenopathy.   Skin:     General: Skin is warm and dry.      Coloration: Skin is not pale.      Findings: No rash.   Neurological:      Mental Status: He is alert and oriented to person, place, and time.   Psychiatric:         Mood and Affect: Mood and affect normal.         Judgment: Judgment normal.         Assessment:       Problem List Items Addressed This Visit        Renal/    BPH (benign prostatic hyperplasia)      Other Visit Diagnoses     Annual physical exam    -  Primary          Plan:    Blood work reviewed with pt     BPH- stable on Cardura

## 2022-07-04 ENCOUNTER — OFFICE VISIT (OUTPATIENT)
Dept: URGENT CARE | Facility: CLINIC | Age: 77
End: 2022-07-04
Payer: MEDICARE

## 2022-07-04 VITALS
HEIGHT: 72 IN | BODY MASS INDEX: 25.06 KG/M2 | WEIGHT: 185 LBS | RESPIRATION RATE: 20 BRPM | DIASTOLIC BLOOD PRESSURE: 67 MMHG | SYSTOLIC BLOOD PRESSURE: 126 MMHG | OXYGEN SATURATION: 95 % | TEMPERATURE: 100 F | HEART RATE: 75 BPM

## 2022-07-04 DIAGNOSIS — U07.1 COVID-19: Primary | ICD-10-CM

## 2022-07-04 DIAGNOSIS — U07.1 COVID-19 VIRUS DETECTED: ICD-10-CM

## 2022-07-04 DIAGNOSIS — R09.81 SINUS CONGESTION: ICD-10-CM

## 2022-07-04 LAB
CTP QC/QA: YES
SARS-COV-2 RDRP RESP QL NAA+PROBE: POSITIVE

## 2022-07-04 PROCEDURE — 99204 OFFICE O/P NEW MOD 45 MIN: CPT | Mod: S$GLB,,, | Performed by: NURSE PRACTITIONER

## 2022-07-04 PROCEDURE — 1160F RVW MEDS BY RX/DR IN RCRD: CPT | Mod: CPTII,S$GLB,, | Performed by: NURSE PRACTITIONER

## 2022-07-04 PROCEDURE — U0002 COVID-19 LAB TEST NON-CDC: HCPCS | Mod: QW,S$GLB,, | Performed by: NURSE PRACTITIONER

## 2022-07-04 PROCEDURE — U0002: ICD-10-PCS | Mod: QW,S$GLB,, | Performed by: NURSE PRACTITIONER

## 2022-07-04 PROCEDURE — 3074F PR MOST RECENT SYSTOLIC BLOOD PRESSURE < 130 MM HG: ICD-10-PCS | Mod: CPTII,S$GLB,, | Performed by: NURSE PRACTITIONER

## 2022-07-04 PROCEDURE — 1125F AMNT PAIN NOTED PAIN PRSNT: CPT | Mod: CPTII,S$GLB,, | Performed by: NURSE PRACTITIONER

## 2022-07-04 PROCEDURE — 99204 PR OFFICE/OUTPT VISIT, NEW, LEVL IV, 45-59 MIN: ICD-10-PCS | Mod: S$GLB,,, | Performed by: NURSE PRACTITIONER

## 2022-07-04 PROCEDURE — 3078F PR MOST RECENT DIASTOLIC BLOOD PRESSURE < 80 MM HG: ICD-10-PCS | Mod: CPTII,S$GLB,, | Performed by: NURSE PRACTITIONER

## 2022-07-04 PROCEDURE — 1159F PR MEDICATION LIST DOCUMENTED IN MEDICAL RECORD: ICD-10-PCS | Mod: CPTII,S$GLB,, | Performed by: NURSE PRACTITIONER

## 2022-07-04 PROCEDURE — 3078F DIAST BP <80 MM HG: CPT | Mod: CPTII,S$GLB,, | Performed by: NURSE PRACTITIONER

## 2022-07-04 PROCEDURE — 1160F PR REVIEW ALL MEDS BY PRESCRIBER/CLIN PHARMACIST DOCUMENTED: ICD-10-PCS | Mod: CPTII,S$GLB,, | Performed by: NURSE PRACTITIONER

## 2022-07-04 PROCEDURE — 1125F PR PAIN SEVERITY QUANTIFIED, PAIN PRESENT: ICD-10-PCS | Mod: CPTII,S$GLB,, | Performed by: NURSE PRACTITIONER

## 2022-07-04 PROCEDURE — 3074F SYST BP LT 130 MM HG: CPT | Mod: CPTII,S$GLB,, | Performed by: NURSE PRACTITIONER

## 2022-07-04 PROCEDURE — 1159F MED LIST DOCD IN RCRD: CPT | Mod: CPTII,S$GLB,, | Performed by: NURSE PRACTITIONER

## 2022-07-04 NOTE — PROGRESS NOTES
Subjective:       Patient ID: Kvng Redd is a 76 y.o. male.    Vitals:  height is 6' (1.829 m) and weight is 83.9 kg (185 lb). His oral temperature is 99.9 °F (37.7 °C). His blood pressure is 126/67 and his pulse is 75. His respiration is 20 and oxygen saturation is 95%.     Chief Complaint: Sinus Problem (Body aches)    This is a 76 y.o. male who presents today with a chief complaint of sinus symptoms, that started on yesterday. Pt explain that he took a home covid that tested positive and since he have been taken otc cough meds and pain meds but no relief.      Sinus Problem  This is a new problem. The current episode started yesterday. The problem is unchanged. There has been no fever. His pain is at a severity of 8/10. The pain is severe. Associated symptoms include congestion, coughing, headaches, sinus pressure and sneezing. Pertinent negatives include no chills, ear pain, hoarse voice, shortness of breath or sore throat. Past treatments include oral decongestants and acetaminophen. The treatment provided no relief.       Constitution: Negative for chills.   HENT: Positive for congestion and sinus pressure. Negative for ear pain and sore throat.    Respiratory: Positive for cough. Negative for shortness of breath.    Allergic/Immunologic: Positive for sneezing.   Neurological: Positive for headaches.       Objective:      Physical Exam   Constitutional: He is oriented to person, place, and time. He appears well-developed. He is cooperative.  Non-toxic appearance. He does not appear ill. No distress.   HENT:   Head: Normocephalic and atraumatic.   Ears:   Right Ear: Hearing, tympanic membrane, external ear and ear canal normal.   Left Ear: Hearing, tympanic membrane, external ear and ear canal normal.   Nose: Nose normal. No mucosal edema, rhinorrhea or nasal deformity. No epistaxis. Right sinus exhibits no maxillary sinus tenderness and no frontal sinus tenderness. Left sinus exhibits no maxillary sinus  tenderness and no frontal sinus tenderness.   Mouth/Throat: Uvula is midline, oropharynx is clear and moist and mucous membranes are normal. No trismus in the jaw. Normal dentition. No uvula swelling. No oropharyngeal exudate, posterior oropharyngeal edema or posterior oropharyngeal erythema.   Eyes: Conjunctivae and lids are normal. No scleral icterus.   Neck: Trachea normal and phonation normal. Neck supple. No edema present. No erythema present. No neck rigidity present.   Cardiovascular: Normal rate, regular rhythm, normal heart sounds and normal pulses.   Pulmonary/Chest: Effort normal and breath sounds normal. No respiratory distress. He has no decreased breath sounds. He has no rhonchi.   Abdominal: Normal appearance.   Musculoskeletal: Normal range of motion.         General: No deformity. Normal range of motion.   Neurological: He is alert and oriented to person, place, and time. He exhibits normal muscle tone. Coordination normal.   Skin: Skin is warm, dry, intact, not diaphoretic and not pale.   Psychiatric: His speech is normal and behavior is normal. Judgment and thought content normal.   Nursing note and vitals reviewed.    Results for orders placed or performed in visit on 07/04/22   POCT COVID-19 Rapid Screening   Result Value Ref Range    POC Rapid COVID Positive (A) Negative     Acceptable Yes            Assessment:       1. COVID-19    2. Sinus congestion          Plan:       Labs ordered at this visit reviewed.     I discussed quarantine process per CDC guidelines and the patient acknowledged understanding and agrees to plan.    Discuss Paxil of it and patient declined    COVID-19    Sinus congestion  -     POCT COVID-19 Rapid Screening

## 2022-07-12 ENCOUNTER — TELEPHONE (OUTPATIENT)
Dept: INTERNAL MEDICINE | Facility: CLINIC | Age: 77
End: 2022-07-12
Payer: MEDICARE

## 2022-07-12 NOTE — TELEPHONE ENCOUNTER
----- Message from Vicki Donahue sent at 7/12/2022  8:51 AM CDT -----  Contact: Wife/Estefania/[phone]  Pt's wife(Estefania) said that she is calling in regards to pt tested positive for Covid last week and he quarantined for the 5 days and pt retested yesterday and tested positive again , she stated that pt is not having any symptoms but cannot return to work without a negative Covid test and wants to know what the doctor advises. Please advise

## 2022-07-12 NOTE — TELEPHONE ENCOUNTER
Spoke to patient and explain to him he could test positive up to 90day after having Covid .  He said he still not feeling 100%   He was able to cut his grass and maybe that why he feel run down today .  He just wanted advice on what he should do .  Explain to patient that this is a viral infection and it has to run it course.  He inquired about med and told him he would have to meet a certain criteria for meds

## 2022-09-07 ENCOUNTER — OFFICE VISIT (OUTPATIENT)
Dept: CARDIOLOGY | Facility: CLINIC | Age: 77
End: 2022-09-07
Payer: MEDICARE

## 2022-09-07 VITALS
BODY MASS INDEX: 24.61 KG/M2 | DIASTOLIC BLOOD PRESSURE: 66 MMHG | WEIGHT: 181.69 LBS | HEIGHT: 72 IN | SYSTOLIC BLOOD PRESSURE: 126 MMHG | HEART RATE: 68 BPM

## 2022-09-07 DIAGNOSIS — I65.23 BILATERAL CAROTID ARTERY STENOSIS: ICD-10-CM

## 2022-09-07 DIAGNOSIS — R94.31 NONSPECIFIC ABNORMAL ELECTROCARDIOGRAM (ECG) (EKG): ICD-10-CM

## 2022-09-07 DIAGNOSIS — E78.2 MIXED HYPERLIPIDEMIA: ICD-10-CM

## 2022-09-07 DIAGNOSIS — Z87.891 FORMER SMOKER: Primary | ICD-10-CM

## 2022-09-07 PROCEDURE — 3288F FALL RISK ASSESSMENT DOCD: CPT | Mod: CPTII,S$GLB,, | Performed by: INTERNAL MEDICINE

## 2022-09-07 PROCEDURE — 99215 PR OFFICE/OUTPT VISIT, EST, LEVL V, 40-54 MIN: ICD-10-PCS | Mod: S$GLB,,, | Performed by: INTERNAL MEDICINE

## 2022-09-07 PROCEDURE — 1100F PR PT FALLS ASSESS DOC 2+ FALLS/FALL W/INJURY/YR: ICD-10-PCS | Mod: CPTII,S$GLB,, | Performed by: INTERNAL MEDICINE

## 2022-09-07 PROCEDURE — 99215 OFFICE O/P EST HI 40 MIN: CPT | Mod: S$GLB,,, | Performed by: INTERNAL MEDICINE

## 2022-09-07 PROCEDURE — 1126F PR PAIN SEVERITY QUANTIFIED, NO PAIN PRESENT: ICD-10-PCS | Mod: CPTII,S$GLB,, | Performed by: INTERNAL MEDICINE

## 2022-09-07 PROCEDURE — 3078F PR MOST RECENT DIASTOLIC BLOOD PRESSURE < 80 MM HG: ICD-10-PCS | Mod: CPTII,S$GLB,, | Performed by: INTERNAL MEDICINE

## 2022-09-07 PROCEDURE — 1126F AMNT PAIN NOTED NONE PRSNT: CPT | Mod: CPTII,S$GLB,, | Performed by: INTERNAL MEDICINE

## 2022-09-07 PROCEDURE — 1100F PTFALLS ASSESS-DOCD GE2>/YR: CPT | Mod: CPTII,S$GLB,, | Performed by: INTERNAL MEDICINE

## 2022-09-07 PROCEDURE — 3074F SYST BP LT 130 MM HG: CPT | Mod: CPTII,S$GLB,, | Performed by: INTERNAL MEDICINE

## 2022-09-07 PROCEDURE — 3074F PR MOST RECENT SYSTOLIC BLOOD PRESSURE < 130 MM HG: ICD-10-PCS | Mod: CPTII,S$GLB,, | Performed by: INTERNAL MEDICINE

## 2022-09-07 PROCEDURE — 1159F MED LIST DOCD IN RCRD: CPT | Mod: CPTII,S$GLB,, | Performed by: INTERNAL MEDICINE

## 2022-09-07 PROCEDURE — 99499 UNLISTED E&M SERVICE: CPT | Mod: S$GLB,,, | Performed by: INTERNAL MEDICINE

## 2022-09-07 PROCEDURE — 3078F DIAST BP <80 MM HG: CPT | Mod: CPTII,S$GLB,, | Performed by: INTERNAL MEDICINE

## 2022-09-07 PROCEDURE — 99499 RISK ADDL DX/OHS AUDIT: ICD-10-PCS | Mod: S$GLB,,, | Performed by: INTERNAL MEDICINE

## 2022-09-07 PROCEDURE — 3288F PR FALLS RISK ASSESSMENT DOCUMENTED: ICD-10-PCS | Mod: CPTII,S$GLB,, | Performed by: INTERNAL MEDICINE

## 2022-09-07 PROCEDURE — 1159F PR MEDICATION LIST DOCUMENTED IN MEDICAL RECORD: ICD-10-PCS | Mod: CPTII,S$GLB,, | Performed by: INTERNAL MEDICINE

## 2022-09-07 PROCEDURE — 99999 PR PBB SHADOW E&M-EST. PATIENT-LVL III: CPT | Mod: PBBFAC,,, | Performed by: INTERNAL MEDICINE

## 2022-09-07 PROCEDURE — 99999 PR PBB SHADOW E&M-EST. PATIENT-LVL III: ICD-10-PCS | Mod: PBBFAC,,, | Performed by: INTERNAL MEDICINE

## 2022-09-07 RX ORDER — CALCIUM CARB/MAGNESIUM CARB 311-232MG
2 TABLET ORAL NIGHTLY
COMMUNITY

## 2022-09-07 NOTE — PROGRESS NOTES
[facility]    CC: Follow up     Patient ID: Kvng Redd is a 77 y.o. male with carotid artery disease, angio-edema, former smoker, who presents for a follow up appointment.  Pertinent history/events are as follows:     -Pt kindy referred by Dr. Garcia for Preoperative Cardiac Risk Stratification Prior to Planned Bilateral Carpal tunnel Release Surgery.     -At our initial clinic visit on 8/19/2020, Mr. Redd reports no chest pain, SOB, LE edema, palpitations, TIA symptom or syncope. Can perform greater than 4 METS with no difficulty.  Formerly smoked a pack a day for 20 years.  Quit in 1980.  He is scheduled for bilateral carpal tunnel release surgery on 9/2/20 with Dr. Mayen.  EKG from 8/17/2020 shows normal sinus rhythm with nonspecific ST/T wave changes.    Plan:   Preoperative Cardiac Risk Stratification Prior to Planned Bilateral Carpal tunnel Release Surgery- Mr. Redd has no chest pain, no heart failure symptoms, and no documented arrhythmia(s).  He can perform greater than 4 METS with no difficulty.  EKG from 8/17/2020 shows normal sinus rhythm with nonspecific ST/T wave changes.  Exercise stress echo from 10/15/2018 showed normal LV systolic function with EF of 60-65%; indeterminate LV diastolic function; normal RV systolic function; estimated PA systolic pressure is 21 mmHg; no evidence of stress induced myocardial ischemia.  He is at low to moderate risk for a perioperative major adverse cardiac event during this moderate risk procedure.  Revised Cardiac Risk Index of 3.9%.  No further testing indicated.  OK to proceed with surgery.    Abnormal EKG- Pt is asymptomatic.  Exercise stress echo from 10/15/2018 showed normal LV systolic function with EF of 60-65%; indeterminate LV diastolic function; normal RV systolic function; estimated PA systolic pressure is 21 mmHg; no evidence of stress induced myocardial ischemia.   HLD- Ten year ASCVD risk score elevated at 22%.  Start ASA 81  mg daily and atorvastatin 40 mg daily.      -Mr. Redd underwent left carpal tunnel release surgery on 9/2/2020 with no complications.      -At follow up clinic visit on 11/23/2020, Mr. Redd reported doing well with no chest pain, SOB, LE edema, TIA symptoms or syncope.  Labs from 11/16/2020 shows total LDL of 55 vs 109 on 10/7/2019.    Plan:   Preoperative Cardiac Risk Stratification Prior to Planned Bilateral Carpal tunnel Release Surgery- Mr. Redd has no chest pain, no heart failure symptoms, and no documented arrhythmia(s).  He can perform greater than 4 METS with no difficulty.  EKG from 8/17/2020 shows normal sinus rhythm with nonspecific ST/T wave changes.  Exercise stress echo from 10/15/2018 showed normal LV systolic function with EF of 60-65%; indeterminate LV diastolic function; normal RV systolic function; estimated PA systolic pressure is 21 mmHg; no evidence of stress induced myocardial ischemia.  He is at low to moderate risk for a perioperative major adverse cardiac event during this moderate risk procedure.  Revised Cardiac Risk Index of 3.9%.  No further testing indicated.  OK to proceed with surgery.    Abnormal EKG- Pt is asymptomatic.  Exercise stress echo from 10/15/2018 showed normal LV systolic function with EF of 60-65%; indeterminate LV diastolic function; normal RV systolic function; estimated PA systolic pressure is 21 mmHg; no evidence of stress induced myocardial ischemia.   HLD- Ten year ASCVD risk score elevated at 22%.   Labs from 11/16/2020 shows total LDL of 55 vs 109 on 10/7/2019.  LFT's within normal limits.  Continue ASA 81 mg daily and atorvastatin 40 mg daily.      -At clinic visit on 6/2/2021, Mr. Redd reported doing well with no chest pain, SOB, LE edema, TIA symptoms or syncope.  Labs from 5/31/2021 show LDL 56 vs 55 on 11/16/2020.  Carotid Ultrasound on 5/31/2021 revealed 20-39% right Internal Carotid Stenosis; 0-19% left Internal Carotid Stenosis.   Plan:    Preoperative Cardiac Risk Stratification Prior to Planned Bilateral Carpal tunnel Release Surgery- Mr. Redd has no chest pain, no heart failure symptoms, and no documented arrhythmia(s).  He can perform greater than 4 METS with no difficulty.  EKG from 8/17/2020 shows normal sinus rhythm with nonspecific ST/T wave changes.  Exercise stress echo from 10/15/2018 showed normal LV systolic function with EF of 60-65%; indeterminate LV diastolic function; normal RV systolic function; estimated PA systolic pressure is 21 mmHg; no evidence of stress induced myocardial ischemia.  He is at low to moderate risk for a perioperative major adverse cardiac event during this moderate risk procedure.  Revised Cardiac Risk Index of 3.9%.  No further testing indicated.  OK to proceed with surgery.    Abnormal EKG- Pt is asymptomatic.  Exercise stress echo from 10/15/2018 showed normal LV systolic function with EF of 60-65%; indeterminate LV diastolic function; normal RV systolic function; estimated PA systolic pressure is 21 mmHg; no evidence of stress induced myocardial ischemia.   HLD- Ten year ASCVD risk score elevated at 22%.  Labs from 5/31/2021 show LDL 56 vs 55 on 11/16/2020.  LFT's within normal limits.  Continue ASA 81 mg daily and atorvastatin 40 mg daily.    Carotid Artery Disease- Carotid Ultrasound on 5/31/2021 revealed 20-39% right Internal Carotid Stenosis; 0-19% left Internal Carotid Stenosis.   Continue ASA 81 mg daily and atorvastatin 40 mg daily.    HPI:  Mr. Redd reports doing well with no chest pain, SOB, LE edema, TIA symptoms or syncope.      Past Medical History:   Diagnosis Date    Allergy     Angio-edema     BPH (benign prostatic hyperplasia)     Chronic idiopathic urticaria 5/8/2017     Past Surgical History:   Procedure Laterality Date    ADENOIDECTOMY      CARPAL TUNNEL RELEASE Bilateral     HERNIA REPAIR      right shoulder replacement      SINUS SURGERY      rhinoplasty    SPINE SURGERY      Four  lumbar spine surgeries    TONSILLECTOMY       Social History     Socioeconomic History    Marital status:    Tobacco Use    Smoking status: Former     Packs/day: 1.00     Years: 34.00     Pack years: 34.00     Types: Cigarettes    Smokeless tobacco: Former     Quit date: 1/1/1980   Substance and Sexual Activity    Alcohol use: No    Drug use: No    Sexual activity: Yes     Partners: Female     Family History   Problem Relation Age of Onset    Suicide Sister     Lung cancer Father     Cancer Father     Cancer Mother     Tuberculosis Mother        Review of patient's allergies indicates:   Allergen Reactions    Diagnostic aids, otherwise unspecified      Angioedema       Medication List with Changes/Refills   Current Medications    ATORVASTATIN (LIPITOR) 40 MG TABLET    TAKE 1 TABLET EVERY DAY    CETIRIZINE (ZYRTEC) 10 MG TABLET    Take 1 tablet (10 mg total) by mouth every evening.    CYANOCOBALAMIN (VITAMIN B-12) 1000 MCG TABLET    Take 1,000 mcg by mouth once daily.     DOXAZOSIN (CARDURA) 4 MG TABLET    Take 4 mg by mouth once daily.     GLUCOSAMINE-CHONDROITIN 500-400 MG TABLET    Take 1 tablet by mouth once daily.    IBUPROFEN (ADVIL,MOTRIN) 200 MG TABLET    Take 800 mg by mouth every 6 (six) hours as needed for Pain.    MELATONIN 5 MG TBDL    Take 2 tablets by mouth every evening.    MONTELUKAST (SINGULAIR) 10 MG TABLET    Take 1 tablet (10 mg total) by mouth every evening.    MULTIVITAMIN ORAL    Take 1 tablet by mouth once daily at 6am.    TURMERIC, BULK, MISC    1,000 mg by Misc.(Non-Drug; Combo Route) route once daily.   Discontinued Medications    MELATONIN 1 MG TAB    Take 10 mg by mouth every evening.       Review of Systems  Constitution: Denies chills, fever, and sweats.  HENT: Denies headaches or blurry vision.  Cardiovascular: Denies chest pain or irregular heart beat.  Respiratory: Denies cough or shortness of breath.  Gastrointestinal: Denies abdominal pain, nausea, or  vomiting.  Musculoskeletal: Denies muscle cramps.  Neurological: Denies dizziness or focal weakness.  Psychiatric/Behavioral: Normal mental status.  Hematologic/Lymphatic: Denies bleeding problem or easy bruising/bleeding.  Skin: Denies rash or suspicious lesions    Physical Examination  /66   Pulse 68   Ht 6' (1.829 m)   Wt 82.4 kg (181 lb 10.5 oz)   BMI 24.64 kg/m²     Constitutional: No acute distress, conversant  HEENT: Sclera anicteric, Pupils equal, round and reactive to light, extraocular motions intact, Oropharynx clear  Neck: No JVD, no carotid bruits  Cardiovascular: regular rate and rhythm, no murmur, rubs or gallops, normal S1/S2  Pulmonary: Clear to auscultation bilaterally  Abdominal: Abdomen soft, nontender, nondistended, positive bowel sounds  Extremities: No lower extremity edema,   Pulses:  Carotid pulses are 2+ on the right side, and 2+ on the left side.  Radial pulses are 2+ on the right side, and 2+ on the left side.   Femoral pulses are 2+ on the right side, and 2+ on the left side.  Popliteal pulses are 2+ on the right side, and 2+ on the left side.   Dorsalis pedis pulses are 2+ on the right side, and 2+ on the left side.   Posterior tibial pulses are 2+ on the right side, and 2+ on the left side.    Skin: No ecchymosis, erythema, or ulcers  Psych: Alert and oriented x 3, appropriate affect  Neuro: CNII-XII intact, no focal deficits    Labs:  Most Recent Data  CBC:   Lab Results   Component Value Date    WBC 5.91 01/10/2022    HGB 13.5 (L) 01/10/2022    HCT 40.8 01/10/2022     01/10/2022    MCV 90 01/10/2022    RDW 13.2 01/10/2022     BMP:   Lab Results   Component Value Date     01/10/2022    K 4.1 01/10/2022     01/10/2022    CO2 26 01/10/2022    BUN 14 01/10/2022    CREATININE 0.9 01/10/2022    GLU 96 01/10/2022    CALCIUM 9.5 01/10/2022     LFTS;   Lab Results   Component Value Date    PROT 7.1 01/10/2022    ALBUMIN 4.0 01/10/2022    BILITOT 0.6 01/10/2022     AST 32 01/10/2022    ALKPHOS 82 01/10/2022    ALT 37 01/10/2022     COAGS: No results found for: INR, PROTIME, PTT  FLP:   Lab Results   Component Value Date    CHOL 128 01/10/2022    HDL 56 01/10/2022    LDLCALC 58.8 (L) 01/10/2022    TRIG 66 01/10/2022    CHOLHDL 43.8 01/10/2022     CARDIAC: No results found for: TROPONINI, CKMB, BNP    EKG 8/17/2020:  Normal sinus rhythm  Nonspecific ST/T wave changes    Carotid Ultrasound 5/31/2021:  There is 20-39% right Internal Carotid Stenosis.  There is 0-19% left Internal Carotid Stenosis.    Exercise Stress Echo 10/15/2018:  CONCLUSIONS     1 - Normal left ventricular systolic function (EF 60-65%).     2 - No wall motion abnormalities.     3 - Indeterminate LV diastolic function.     4 - Normal right ventricular systolic function .     5 - The estimated PA systolic pressure is 21 mmHg.   No evidence of stress induced myocardial ischemia.     Assessment/Plan:  Kvng Redd is a 77 y.o. male with carotid artery disease, angio-edema, former smoker, who presents for a follow up appointment.     1.  HLD- LDL 59 on 2/1/2022.  Continue ASA 81 mg daily and atorvastatin 40 mg daily.      2. Carotid Artery Disease- Carotid Ultrasound on 5/31/2021 revealed 20-39% right Internal Carotid Stenosis; 0-19% left Internal Carotid Stenosis.   Continue ASA 81 mg daily and atorvastatin 40 mg daily.    Follow up in 1 year     Total duration of face to face visit time 30 minutes.  Total time spent counseling greater than fifty percent of total visit time.  Counseling included discussion regarding imaging findings, diagnosis, possibilities, treatment options, risks and benefits.  The patient had many questions regarding the options and long-term effects.    Hemanth Gerardo MD, PhD  Interventional Cardiology

## 2022-09-07 NOTE — PATIENT INSTRUCTIONS
Assessment/Plan:  Kvng Redd is a 77 y.o. male with carotid artery disease, angio-edema, former smoker, who presents for a follow up appointment.     1.  HLD- LDL 59 on 2/1/2022.  Continue ASA 81 mg daily and atorvastatin 40 mg daily.      2. Carotid Artery Disease- Carotid Ultrasound on 5/31/2021 revealed 20-39% right Internal Carotid Stenosis; 0-19% left Internal Carotid Stenosis.   Continue ASA 81 mg daily and atorvastatin 40 mg daily.    Follow up in 1 year

## 2022-11-21 ENCOUNTER — TELEPHONE (OUTPATIENT)
Dept: INTERNAL MEDICINE | Facility: CLINIC | Age: 77
End: 2022-11-21
Payer: MEDICARE

## 2022-11-21 DIAGNOSIS — R35.1 NOCTURIA: ICD-10-CM

## 2022-11-21 DIAGNOSIS — E78.2 MIXED HYPERLIPIDEMIA: ICD-10-CM

## 2022-11-21 DIAGNOSIS — R73.9 ELEVATED BLOOD SUGAR: ICD-10-CM

## 2022-11-21 DIAGNOSIS — Z00.00 ANNUAL PHYSICAL EXAM: Primary | ICD-10-CM

## 2022-11-21 DIAGNOSIS — N40.0 BENIGN PROSTATIC HYPERPLASIA, UNSPECIFIED WHETHER LOWER URINARY TRACT SYMPTOMS PRESENT: ICD-10-CM

## 2022-11-21 NOTE — TELEPHONE ENCOUNTER
----- Message from Regla Noel sent at 11/21/2022  9:20 AM CST -----  Contact: [phone]  type: Lab    Caller is requesting to schedule their Lab appointment prior to annual appointment.  Order is not listed in EPIC.  Please enter order and contact patient to schedule.    Name of Caller:Kvng Harris Date and Time of Labs : n/a    Date of Annual Physical Appointment: 2/15/22     Where would they like the lab performed?n/a    Would the patient rather a call back or a response via My [facility]? call    Best Call Back Number:[phone]

## 2023-02-08 ENCOUNTER — LAB VISIT (OUTPATIENT)
Dept: LAB | Facility: HOSPITAL | Age: 78
End: 2023-02-08
Attending: INTERNAL MEDICINE
Payer: MEDICARE

## 2023-02-08 DIAGNOSIS — R35.1 NOCTURIA: ICD-10-CM

## 2023-02-08 DIAGNOSIS — E78.2 MIXED HYPERLIPIDEMIA: ICD-10-CM

## 2023-02-08 DIAGNOSIS — Z00.00 ANNUAL PHYSICAL EXAM: ICD-10-CM

## 2023-02-08 DIAGNOSIS — R73.9 ELEVATED BLOOD SUGAR: ICD-10-CM

## 2023-02-08 LAB
ALBUMIN SERPL BCP-MCNC: 3.9 G/DL (ref 3.5–5.2)
ALP SERPL-CCNC: 78 U/L (ref 55–135)
ALT SERPL W/O P-5'-P-CCNC: 26 U/L (ref 10–44)
ANION GAP SERPL CALC-SCNC: 10 MMOL/L (ref 8–16)
AST SERPL-CCNC: 30 U/L (ref 10–40)
BASOPHILS # BLD AUTO: 0.04 K/UL (ref 0–0.2)
BASOPHILS NFR BLD: 0.8 % (ref 0–1.9)
BILIRUB SERPL-MCNC: 0.6 MG/DL (ref 0.1–1)
BUN SERPL-MCNC: 18 MG/DL (ref 8–23)
CALCIUM SERPL-MCNC: 9.5 MG/DL (ref 8.7–10.5)
CHLORIDE SERPL-SCNC: 105 MMOL/L (ref 95–110)
CHOLEST SERPL-MCNC: 117 MG/DL (ref 120–199)
CHOLEST/HDLC SERPL: 2.4 {RATIO} (ref 2–5)
CO2 SERPL-SCNC: 23 MMOL/L (ref 23–29)
COMPLEXED PSA SERPL-MCNC: 2.2 NG/ML (ref 0–4)
CREAT SERPL-MCNC: 1 MG/DL (ref 0.5–1.4)
DIFFERENTIAL METHOD: ABNORMAL
EOSINOPHIL # BLD AUTO: 0.3 K/UL (ref 0–0.5)
EOSINOPHIL NFR BLD: 5.4 % (ref 0–8)
ERYTHROCYTE [DISTWIDTH] IN BLOOD BY AUTOMATED COUNT: 12.8 % (ref 11.5–14.5)
EST. GFR  (NO RACE VARIABLE): >60 ML/MIN/1.73 M^2
ESTIMATED AVG GLUCOSE: 117 MG/DL (ref 68–131)
GLUCOSE SERPL-MCNC: 90 MG/DL (ref 70–110)
HBA1C MFR BLD: 5.7 % (ref 4–5.6)
HCT VFR BLD AUTO: 38.9 % (ref 40–54)
HDLC SERPL-MCNC: 48 MG/DL (ref 40–75)
HDLC SERPL: 41 % (ref 20–50)
HGB BLD-MCNC: 12.7 G/DL (ref 14–18)
IMM GRANULOCYTES # BLD AUTO: 0.02 K/UL (ref 0–0.04)
IMM GRANULOCYTES NFR BLD AUTO: 0.4 % (ref 0–0.5)
LDLC SERPL CALC-MCNC: 57.6 MG/DL (ref 63–159)
LYMPHOCYTES # BLD AUTO: 1.4 K/UL (ref 1–4.8)
LYMPHOCYTES NFR BLD: 25.9 % (ref 18–48)
MCH RBC QN AUTO: 29.5 PG (ref 27–31)
MCHC RBC AUTO-ENTMCNC: 32.6 G/DL (ref 32–36)
MCV RBC AUTO: 90 FL (ref 82–98)
MONOCYTES # BLD AUTO: 0.5 K/UL (ref 0.3–1)
MONOCYTES NFR BLD: 10.2 % (ref 4–15)
NEUTROPHILS # BLD AUTO: 3 K/UL (ref 1.8–7.7)
NEUTROPHILS NFR BLD: 57.3 % (ref 38–73)
NONHDLC SERPL-MCNC: 69 MG/DL
NRBC BLD-RTO: 0 /100 WBC
PLATELET # BLD AUTO: 175 K/UL (ref 150–450)
PMV BLD AUTO: 10.8 FL (ref 9.2–12.9)
POTASSIUM SERPL-SCNC: 4 MMOL/L (ref 3.5–5.1)
PROT SERPL-MCNC: 6.7 G/DL (ref 6–8.4)
RBC # BLD AUTO: 4.31 M/UL (ref 4.6–6.2)
SODIUM SERPL-SCNC: 138 MMOL/L (ref 136–145)
TRIGL SERPL-MCNC: 57 MG/DL (ref 30–150)
TSH SERPL DL<=0.005 MIU/L-ACNC: 2.27 UIU/ML (ref 0.4–4)
WBC # BLD AUTO: 5.22 K/UL (ref 3.9–12.7)

## 2023-02-08 PROCEDURE — 80053 COMPREHEN METABOLIC PANEL: CPT | Mod: HCNC | Performed by: INTERNAL MEDICINE

## 2023-02-08 PROCEDURE — 84153 ASSAY OF PSA TOTAL: CPT | Mod: HCNC | Performed by: INTERNAL MEDICINE

## 2023-02-08 PROCEDURE — 84443 ASSAY THYROID STIM HORMONE: CPT | Mod: HCNC | Performed by: INTERNAL MEDICINE

## 2023-02-08 PROCEDURE — 36415 COLL VENOUS BLD VENIPUNCTURE: CPT | Mod: HCNC,PO | Performed by: INTERNAL MEDICINE

## 2023-02-08 PROCEDURE — 80061 LIPID PANEL: CPT | Mod: HCNC | Performed by: INTERNAL MEDICINE

## 2023-02-08 PROCEDURE — 85025 COMPLETE CBC W/AUTO DIFF WBC: CPT | Mod: HCNC | Performed by: INTERNAL MEDICINE

## 2023-02-08 PROCEDURE — 83036 HEMOGLOBIN GLYCOSYLATED A1C: CPT | Mod: HCNC | Performed by: INTERNAL MEDICINE

## 2023-02-09 DIAGNOSIS — Z00.00 ENCOUNTER FOR MEDICARE ANNUAL WELLNESS EXAM: ICD-10-CM

## 2023-02-15 ENCOUNTER — OFFICE VISIT (OUTPATIENT)
Dept: INTERNAL MEDICINE | Facility: CLINIC | Age: 78
End: 2023-02-15
Payer: MEDICARE

## 2023-02-15 VITALS
OXYGEN SATURATION: 98 % | HEIGHT: 72 IN | BODY MASS INDEX: 24.89 KG/M2 | SYSTOLIC BLOOD PRESSURE: 122 MMHG | HEART RATE: 80 BPM | TEMPERATURE: 98 F | DIASTOLIC BLOOD PRESSURE: 70 MMHG | RESPIRATION RATE: 19 BRPM | WEIGHT: 183.75 LBS

## 2023-02-15 DIAGNOSIS — R73.9 ELEVATED BLOOD SUGAR: ICD-10-CM

## 2023-02-15 DIAGNOSIS — N40.0 BENIGN PROSTATIC HYPERPLASIA, UNSPECIFIED WHETHER LOWER URINARY TRACT SYMPTOMS PRESENT: ICD-10-CM

## 2023-02-15 DIAGNOSIS — Z12.5 SCREENING PSA (PROSTATE SPECIFIC ANTIGEN): ICD-10-CM

## 2023-02-15 DIAGNOSIS — Z00.00 ANNUAL PHYSICAL EXAM: Primary | ICD-10-CM

## 2023-02-15 DIAGNOSIS — E78.2 MIXED HYPERLIPIDEMIA: ICD-10-CM

## 2023-02-15 DIAGNOSIS — Z00.00 PREVENTATIVE HEALTH CARE: ICD-10-CM

## 2023-02-15 PROCEDURE — 1160F PR REVIEW ALL MEDS BY PRESCRIBER/CLIN PHARMACIST DOCUMENTED: ICD-10-PCS | Mod: HCNC,CPTII,S$GLB, | Performed by: INTERNAL MEDICINE

## 2023-02-15 PROCEDURE — 1101F PT FALLS ASSESS-DOCD LE1/YR: CPT | Mod: HCNC,CPTII,S$GLB, | Performed by: INTERNAL MEDICINE

## 2023-02-15 PROCEDURE — 1159F PR MEDICATION LIST DOCUMENTED IN MEDICAL RECORD: ICD-10-PCS | Mod: HCNC,CPTII,S$GLB, | Performed by: INTERNAL MEDICINE

## 2023-02-15 PROCEDURE — 99397 PER PM REEVAL EST PAT 65+ YR: CPT | Mod: HCNC,S$GLB,, | Performed by: INTERNAL MEDICINE

## 2023-02-15 PROCEDURE — 1126F PR PAIN SEVERITY QUANTIFIED, NO PAIN PRESENT: ICD-10-PCS | Mod: HCNC,CPTII,S$GLB, | Performed by: INTERNAL MEDICINE

## 2023-02-15 PROCEDURE — G0009 ADMIN PNEUMOCOCCAL VACCINE: HCPCS | Mod: HCNC,S$GLB,, | Performed by: INTERNAL MEDICINE

## 2023-02-15 PROCEDURE — 99999 PR PBB SHADOW E&M-EST. PATIENT-LVL IV: ICD-10-PCS | Mod: PBBFAC,HCNC,, | Performed by: INTERNAL MEDICINE

## 2023-02-15 PROCEDURE — 99397 PR PREVENTIVE VISIT,EST,65 & OVER: ICD-10-PCS | Mod: HCNC,S$GLB,, | Performed by: INTERNAL MEDICINE

## 2023-02-15 PROCEDURE — 3078F PR MOST RECENT DIASTOLIC BLOOD PRESSURE < 80 MM HG: ICD-10-PCS | Mod: HCNC,CPTII,S$GLB, | Performed by: INTERNAL MEDICINE

## 2023-02-15 PROCEDURE — 1160F RVW MEDS BY RX/DR IN RCRD: CPT | Mod: HCNC,CPTII,S$GLB, | Performed by: INTERNAL MEDICINE

## 2023-02-15 PROCEDURE — 1126F AMNT PAIN NOTED NONE PRSNT: CPT | Mod: HCNC,CPTII,S$GLB, | Performed by: INTERNAL MEDICINE

## 2023-02-15 PROCEDURE — 3078F DIAST BP <80 MM HG: CPT | Mod: HCNC,CPTII,S$GLB, | Performed by: INTERNAL MEDICINE

## 2023-02-15 PROCEDURE — 90677 PNEUMOCOCCAL CONJUGATE VACCINE 20-VALENT: ICD-10-PCS | Mod: HCNC,S$GLB,, | Performed by: INTERNAL MEDICINE

## 2023-02-15 PROCEDURE — 99999 PR PBB SHADOW E&M-EST. PATIENT-LVL IV: CPT | Mod: PBBFAC,HCNC,, | Performed by: INTERNAL MEDICINE

## 2023-02-15 PROCEDURE — 3074F PR MOST RECENT SYSTOLIC BLOOD PRESSURE < 130 MM HG: ICD-10-PCS | Mod: HCNC,CPTII,S$GLB, | Performed by: INTERNAL MEDICINE

## 2023-02-15 PROCEDURE — 3074F SYST BP LT 130 MM HG: CPT | Mod: HCNC,CPTII,S$GLB, | Performed by: INTERNAL MEDICINE

## 2023-02-15 PROCEDURE — 3288F PR FALLS RISK ASSESSMENT DOCUMENTED: ICD-10-PCS | Mod: HCNC,CPTII,S$GLB, | Performed by: INTERNAL MEDICINE

## 2023-02-15 PROCEDURE — 90677 PCV20 VACCINE IM: CPT | Mod: HCNC,S$GLB,, | Performed by: INTERNAL MEDICINE

## 2023-02-15 PROCEDURE — G0009 PNEUMOCOCCAL CONJUGATE VACCINE 20-VALENT: ICD-10-PCS | Mod: HCNC,S$GLB,, | Performed by: INTERNAL MEDICINE

## 2023-02-15 PROCEDURE — 1159F MED LIST DOCD IN RCRD: CPT | Mod: HCNC,CPTII,S$GLB, | Performed by: INTERNAL MEDICINE

## 2023-02-15 PROCEDURE — 3288F FALL RISK ASSESSMENT DOCD: CPT | Mod: HCNC,CPTII,S$GLB, | Performed by: INTERNAL MEDICINE

## 2023-02-15 PROCEDURE — 1101F PR PT FALLS ASSESS DOC 0-1 FALLS W/OUT INJ PAST YR: ICD-10-PCS | Mod: HCNC,CPTII,S$GLB, | Performed by: INTERNAL MEDICINE

## 2023-02-15 NOTE — PROGRESS NOTES
Subjective:       Patient ID: Kvng Redd is a 77 y.o. male.    Chief Complaint: Annual Exam    HPI  77 y.o. Male here for annual exam.      Vaccines: Influenza (2022); Tetanus (2018); Pneumovax (current); Shingrix (2019)  Eye exam: 2018  Colonoscopy: 6/19  DEXA: declined     Exercise: walks  Diet: regular     Past Medical History:  No date: Allergy  No date: Angio-edema  No date: BPH (benign prostatic hyperplasia)  5/8/2017: Chronic idiopathic urticaria  Past Surgical History:  No date: ADENOIDECTOMY  No date: HERNIA REPAIR  No date: right shoulder replacement  No date: SINUS SURGERY      Comment:  rhinoplasty  No date: SPINE SURGERY  No date: TONSILLECTOMY  No date: B/L Carpal tunnel release   Social History    Socioeconomic History      Marital status:       Spouse name: Not on file      Number of children: Not on file      Years of education: Not on file      Highest education level: Not on file    Social Needs      Financial resource strain: Not on file      Food insecurity - worry: Not on file      Food insecurity - inability: Not on file      Transportation needs - medical: Not on file      Transportation needs - non-medical: Not on file    Occupational History      Hairdresser     Tobacco Use      Smoking status: Former Smoker        Packs/day: 1.00        Years: 34.00        Pack years: 34        Types: Cigarettes      Smokeless tobacco: Former User        Quit date: 1/1/1980    Substance and Sexual Activity      Alcohol use: No      Drug use: No      Sexual activity: Yes        Partners: Female     Review of patient's allergies indicates:  No Known Allergies  Review of Systems   Constitutional:  Negative for activity change, appetite change, chills, diaphoresis, fatigue, fever and unexpected weight change.   HENT:  Negative for nasal congestion, mouth sores, postnasal drip, rhinorrhea, sinus pressure/congestion, sneezing, sore throat, trouble swallowing and voice change.    Eyes:  Negative for  discharge, itching and visual disturbance.   Respiratory:  Negative for cough, chest tightness, shortness of breath and wheezing.    Cardiovascular:  Negative for chest pain, palpitations and leg swelling.   Gastrointestinal:  Negative for abdominal pain, blood in stool, constipation, diarrhea, nausea and vomiting.   Endocrine: Negative for cold intolerance and heat intolerance.   Genitourinary:  Negative for difficulty urinating, dysuria, flank pain, hematuria and urgency.   Musculoskeletal:  Negative for arthralgias, back pain, myalgias and neck pain.   Integumentary:  Negative for rash and wound.   Allergic/Immunologic: Negative for environmental allergies and food allergies.   Neurological:  Negative for dizziness, tremors, seizures, syncope, weakness and headaches.   Hematological:  Negative for adenopathy. Does not bruise/bleed easily.   Psychiatric/Behavioral:  Negative for confusion, sleep disturbance and suicidal ideas. The patient is not nervous/anxious.        Objective:      Physical Exam  Constitutional:       General: He is not in acute distress.     Appearance: Normal appearance. He is well-developed. He is not ill-appearing, toxic-appearing or diaphoretic.   HENT:      Head: Normocephalic and atraumatic.      Right Ear: External ear normal.      Left Ear: External ear normal.      Nose: Nose normal.      Mouth/Throat:      Pharynx: No oropharyngeal exudate.   Eyes:      General: No scleral icterus.        Right eye: No discharge.         Left eye: No discharge.      Extraocular Movements: Extraocular movements intact.      Conjunctiva/sclera: Conjunctivae normal.      Pupils: Pupils are equal, round, and reactive to light.   Neck:      Thyroid: No thyromegaly.      Vascular: No JVD.   Cardiovascular:      Rate and Rhythm: Normal rate and regular rhythm.      Pulses: Normal pulses.      Heart sounds: Normal heart sounds. No murmur heard.  Pulmonary:      Effort: Pulmonary effort is normal. No  respiratory distress.      Breath sounds: Normal breath sounds. No wheezing or rales.   Abdominal:      General: Bowel sounds are normal. There is no distension.      Palpations: Abdomen is soft.      Tenderness: There is no abdominal tenderness. There is no right CVA tenderness, left CVA tenderness, guarding or rebound.   Musculoskeletal:      Cervical back: Normal range of motion and neck supple. No rigidity.      Right lower leg: No edema.      Left lower leg: No edema.   Lymphadenopathy:      Cervical: No cervical adenopathy.   Skin:     General: Skin is warm and dry.      Capillary Refill: Capillary refill takes less than 2 seconds.      Coloration: Skin is not pale.      Findings: No rash.   Neurological:      General: No focal deficit present.      Mental Status: He is alert and oriented to person, place, and time. Mental status is at baseline.      Cranial Nerves: No cranial nerve deficit.      Sensory: No sensory deficit.      Motor: No weakness.      Coordination: Coordination normal.      Gait: Gait normal.      Deep Tendon Reflexes: Reflexes normal.   Psychiatric:         Mood and Affect: Mood normal.         Behavior: Behavior normal.         Thought Content: Thought content normal.         Judgment: Judgment normal.       Assessment:       Problem List Items Addressed This Visit          Cardiac/Vascular    Mixed hyperlipidemia       Renal/    BPH (benign prostatic hyperplasia)     Other Visit Diagnoses       Annual physical exam    -  Primary    Relevant Orders    (In Office Administered) Pneumococcal Conjugate Vaccine (20 Valent) (IM)            Plan:    Blood work reviewed with pt      BPH- stable on Cardura     HLD- on Lipitor

## 2023-03-27 RX ORDER — MONTELUKAST SODIUM 10 MG/1
TABLET ORAL
Qty: 30 TABLET | Refills: 0 | Status: SHIPPED | OUTPATIENT
Start: 2023-03-27 | End: 2023-06-13 | Stop reason: SDUPTHER

## 2023-05-24 ENCOUNTER — TELEPHONE (OUTPATIENT)
Dept: ALLERGY | Facility: CLINIC | Age: 78
End: 2023-05-24
Payer: MEDICARE

## 2023-05-24 NOTE — TELEPHONE ENCOUNTER
Received a message to call the pt and schedule an appointment. The pt had an appointment already schedule with Dr. Bettencourt in June.

## 2023-06-06 ENCOUNTER — TELEPHONE (OUTPATIENT)
Dept: ALLERGY | Facility: CLINIC | Age: 78
End: 2023-06-06
Payer: MEDICARE

## 2023-06-06 NOTE — TELEPHONE ENCOUNTER
----- Message from Donna Vasquez sent at 6/6/2023 11:20 AM CDT -----  Regarding: Refill  Contact: Estefania [phone] or [phone]  Estefania/ wife is calling to see why her  pharmacist can't get in touch with the provider about Rx: montelukast (SINGULAIR) 10 mg tablet 30 tablet states the pharmacists states it keep being rejected  please call       [facility] - [place], [place] - [address]   [address]  [facility] [address]  Phone: [phone] Fax: [phone]

## 2023-06-13 ENCOUNTER — OFFICE VISIT (OUTPATIENT)
Dept: ALLERGY | Facility: CLINIC | Age: 78
End: 2023-06-13
Payer: MEDICARE

## 2023-06-13 VITALS — WEIGHT: 183.19 LBS | HEIGHT: 72 IN | BODY MASS INDEX: 24.81 KG/M2

## 2023-06-13 DIAGNOSIS — E78.2 MIXED HYPERLIPIDEMIA: ICD-10-CM

## 2023-06-13 DIAGNOSIS — T78.3XXD ANGIOEDEMA, SUBSEQUENT ENCOUNTER: Primary | ICD-10-CM

## 2023-06-13 DIAGNOSIS — L29.9 PRURITUS: ICD-10-CM

## 2023-06-13 DIAGNOSIS — J30.89 ALLERGIC RHINITIS DUE TO DUST MITE: ICD-10-CM

## 2023-06-13 DIAGNOSIS — L50.8 CHRONIC URTICARIA: ICD-10-CM

## 2023-06-13 PROCEDURE — 1159F PR MEDICATION LIST DOCUMENTED IN MEDICAL RECORD: ICD-10-PCS | Mod: CPTII,S$GLB,, | Performed by: ALLERGY & IMMUNOLOGY

## 2023-06-13 PROCEDURE — 1159F MED LIST DOCD IN RCRD: CPT | Mod: CPTII,S$GLB,, | Performed by: ALLERGY & IMMUNOLOGY

## 2023-06-13 PROCEDURE — 1126F AMNT PAIN NOTED NONE PRSNT: CPT | Mod: CPTII,S$GLB,, | Performed by: ALLERGY & IMMUNOLOGY

## 2023-06-13 PROCEDURE — 99999 PR PBB SHADOW E&M-EST. PATIENT-LVL II: ICD-10-PCS | Mod: PBBFAC,,, | Performed by: ALLERGY & IMMUNOLOGY

## 2023-06-13 PROCEDURE — 1126F PR PAIN SEVERITY QUANTIFIED, NO PAIN PRESENT: ICD-10-PCS | Mod: CPTII,S$GLB,, | Performed by: ALLERGY & IMMUNOLOGY

## 2023-06-13 PROCEDURE — 1160F RVW MEDS BY RX/DR IN RCRD: CPT | Mod: CPTII,S$GLB,, | Performed by: ALLERGY & IMMUNOLOGY

## 2023-06-13 PROCEDURE — 99999 PR PBB SHADOW E&M-EST. PATIENT-LVL II: CPT | Mod: PBBFAC,,, | Performed by: ALLERGY & IMMUNOLOGY

## 2023-06-13 PROCEDURE — 99214 PR OFFICE/OUTPT VISIT, EST, LEVL IV, 30-39 MIN: ICD-10-PCS | Mod: S$GLB,,, | Performed by: ALLERGY & IMMUNOLOGY

## 2023-06-13 PROCEDURE — 1160F PR REVIEW ALL MEDS BY PRESCRIBER/CLIN PHARMACIST DOCUMENTED: ICD-10-PCS | Mod: CPTII,S$GLB,, | Performed by: ALLERGY & IMMUNOLOGY

## 2023-06-13 PROCEDURE — 99214 OFFICE O/P EST MOD 30 MIN: CPT | Mod: S$GLB,,, | Performed by: ALLERGY & IMMUNOLOGY

## 2023-06-13 RX ORDER — ASPIRIN 81 MG/1
81 TABLET ORAL DAILY
COMMUNITY

## 2023-06-13 RX ORDER — MONTELUKAST SODIUM 10 MG/1
10 TABLET ORAL NIGHTLY
Qty: 90 TABLET | Refills: 4 | Status: SHIPPED | OUTPATIENT
Start: 2023-06-13

## 2023-06-13 NOTE — PROGRESS NOTES
Chief Complaint: No chief complaint on file.        Subjective:         HPI    Kvng Redd is a 77 y.o. male here for continued evaluation of angioedema, allergic rhinitis and prior history of hives. He was last seen 1/20/2022. He states this started in 2016. He saw [facility] and had labs and all normal. Then had skin test inhalants positive to dust mites and skin test foods all negative. 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 He takes montelukast 10 mg and cetirizine 10 mg every night (weaned down from 20 mg and still controlled). This keeps it controlled. He no longer feels tingling in lips and never swells. Prior to meds had frequent lip, eye and facial swelling. He denies hives but was reported in prior notes. He has not missed meds and is well controlled. He has no rhinitis on this regimen, no congestion, runny nose or sneeze. He used to get sneeze and runny nose often, feels zyrtec controls it. No cough SOB or wheeze.          Review of Systems  Constitutional: Negative for changes in appetite, unintentional weight loss, fever, chills and fatigue.   HENT: Negative for facial pain, nose bleeds, nasal congestion, postnasal drip, throat clearing, sinus pressure, and voice change. Negative for ear discharge, ear pain, facial swelling, sore throat and trouble swallowing.  Eyes: Negative for occular discharge, redness, itching and visual disturbance.  Respiratory: Negative for chest tightness, shortness of breath, wheezing, dyspnea on exertion, sputum production and cough.   Cardiovascular: Negative for chest pain, palpitations and leg swelling.  Gastrointestinal: Negative for abdominal distension, abdominal pain, constipation, diarrhea, nausea,and vomiting.   Genitourinary: Negative for difficulty urinating.   Musculoskeletal: Negative for arthralgias, gait problems, joint swelling, myalgias and back pain.   Neurological: Negative for dizziness, syncope, weakness, light-headedness, and headaches.    Hematological: Negative for adenopathy, does not bruise or bleed easily.  Psychiatric/Behavioral: Negative for agitation, anxiety, behavioral problems, confusion, and insomnia.  Skin: Positive for angioedema as above.     PMH:  Reviewed with the patient and current for this visit    Family History:  Reviewed with the patient and current for this visit    Social History:  Reviewed with the patient and current for this visit     Environmental History:  Reviewed with the patient and current for this visit          Objective:      Skin Test results: Positive to house dust mites; test was prick skin test done on 4/28/2016.  Skin test for foods done on the same day was negative.The positive histamine control (3+) and the negative saline control (0), indicate that this was a valid assessment of allergic recognition.     Immunotherapy: Patient has not been on allergen specific immunotherapy     Physical Exam  General:patient is well developed and well nourished, in no acute distress.  Mental Status:  Alert, oriented and cooperative  Head and Face: normocephalic   Allergic shiners: No  Eyes:   Scleral conjunctiva: clear; Cornea: clear; Palprebal conjunctiva: normal: Eyelid Skin: normal  Lungs: Air movement is good; respiratory effort is good; no respiratory distress; no cough.  Extremities: no cyanosis, clubbing or edema  Skin:no rashes or lesions present; skin hydrated and supple.         Assessment:       1. Angioedema, subsequent encounter        2. Chronic urticaria        3. Allergic rhinitis due to dust mite        4. Pruritus        5. Mixed hyperlipidemia                Plan:         1. continue cetirizine 10 mg nightly.  2. Montelukast 10 mg daily  3. benadryl as needed if flares  4. RTC annually or sooner if needed     I spent a total of 30 minutes on the day of the visit.  This includes face to face time and non-face to face time preparing to see the patient (eg, review of tests), obtaining and/or reviewing  separately obtained history, documenting clinical information in the electronic or other health record, independently interpreting results and communicating results to the patient/family/caregiver, or care coordinator.         Ya Bettencourt MD

## 2023-08-30 ENCOUNTER — TELEPHONE (OUTPATIENT)
Dept: INTERNAL MEDICINE | Facility: CLINIC | Age: 78
End: 2023-08-30
Payer: MEDICARE

## 2023-08-30 NOTE — TELEPHONE ENCOUNTER
----- Message from Cleve Watson sent at 8/30/2023  1:13 PM CDT -----  Contact: [phone]  type: Lab  Preferred Date and Time of Labs: week before annual early     Date of Annual Physical Appointment:2/23/24    Where would they like the lab performed?[facility]    Would the patient rather a call back or a response via My [facility]? Call

## 2023-09-12 RX ORDER — ATORVASTATIN CALCIUM 40 MG/1
40 TABLET, FILM COATED ORAL DAILY
Qty: 90 TABLET | Refills: 0 | Status: SHIPPED | OUTPATIENT
Start: 2023-09-12 | End: 2024-01-03

## 2023-10-11 ENCOUNTER — OFFICE VISIT (OUTPATIENT)
Dept: CARDIOLOGY | Facility: CLINIC | Age: 78
End: 2023-10-11
Payer: MEDICARE

## 2023-10-11 VITALS
HEIGHT: 71 IN | HEART RATE: 72 BPM | DIASTOLIC BLOOD PRESSURE: 76 MMHG | WEIGHT: 184.06 LBS | SYSTOLIC BLOOD PRESSURE: 130 MMHG | BODY MASS INDEX: 25.77 KG/M2

## 2023-10-11 DIAGNOSIS — Z87.891 FORMER SMOKER: ICD-10-CM

## 2023-10-11 DIAGNOSIS — I65.23 BILATERAL CAROTID ARTERY STENOSIS: Primary | ICD-10-CM

## 2023-10-11 DIAGNOSIS — E78.2 MIXED HYPERLIPIDEMIA: ICD-10-CM

## 2023-10-11 PROCEDURE — 1159F PR MEDICATION LIST DOCUMENTED IN MEDICAL RECORD: ICD-10-PCS | Mod: HCNC,CPTII,S$GLB, | Performed by: INTERNAL MEDICINE

## 2023-10-11 PROCEDURE — 1126F PR PAIN SEVERITY QUANTIFIED, NO PAIN PRESENT: ICD-10-PCS | Mod: HCNC,CPTII,S$GLB, | Performed by: INTERNAL MEDICINE

## 2023-10-11 PROCEDURE — 99999 PR PBB SHADOW E&M-EST. PATIENT-LVL III: ICD-10-PCS | Mod: PBBFAC,HCNC,, | Performed by: INTERNAL MEDICINE

## 2023-10-11 PROCEDURE — 3075F SYST BP GE 130 - 139MM HG: CPT | Mod: HCNC,CPTII,S$GLB, | Performed by: INTERNAL MEDICINE

## 2023-10-11 PROCEDURE — 1101F PR PT FALLS ASSESS DOC 0-1 FALLS W/OUT INJ PAST YR: ICD-10-PCS | Mod: HCNC,CPTII,S$GLB, | Performed by: INTERNAL MEDICINE

## 2023-10-11 PROCEDURE — 1101F PT FALLS ASSESS-DOCD LE1/YR: CPT | Mod: HCNC,CPTII,S$GLB, | Performed by: INTERNAL MEDICINE

## 2023-10-11 PROCEDURE — 1126F AMNT PAIN NOTED NONE PRSNT: CPT | Mod: HCNC,CPTII,S$GLB, | Performed by: INTERNAL MEDICINE

## 2023-10-11 PROCEDURE — 1159F MED LIST DOCD IN RCRD: CPT | Mod: HCNC,CPTII,S$GLB, | Performed by: INTERNAL MEDICINE

## 2023-10-11 PROCEDURE — 3078F PR MOST RECENT DIASTOLIC BLOOD PRESSURE < 80 MM HG: ICD-10-PCS | Mod: HCNC,CPTII,S$GLB, | Performed by: INTERNAL MEDICINE

## 2023-10-11 PROCEDURE — 99215 OFFICE O/P EST HI 40 MIN: CPT | Mod: HCNC,S$GLB,, | Performed by: INTERNAL MEDICINE

## 2023-10-11 PROCEDURE — 99999 PR PBB SHADOW E&M-EST. PATIENT-LVL III: CPT | Mod: PBBFAC,HCNC,, | Performed by: INTERNAL MEDICINE

## 2023-10-11 PROCEDURE — 3075F PR MOST RECENT SYSTOLIC BLOOD PRESS GE 130-139MM HG: ICD-10-PCS | Mod: HCNC,CPTII,S$GLB, | Performed by: INTERNAL MEDICINE

## 2023-10-11 PROCEDURE — 3288F PR FALLS RISK ASSESSMENT DOCUMENTED: ICD-10-PCS | Mod: HCNC,CPTII,S$GLB, | Performed by: INTERNAL MEDICINE

## 2023-10-11 PROCEDURE — 3288F FALL RISK ASSESSMENT DOCD: CPT | Mod: HCNC,CPTII,S$GLB, | Performed by: INTERNAL MEDICINE

## 2023-10-11 PROCEDURE — 3078F DIAST BP <80 MM HG: CPT | Mod: HCNC,CPTII,S$GLB, | Performed by: INTERNAL MEDICINE

## 2023-10-11 PROCEDURE — 99215 PR OFFICE/OUTPT VISIT, EST, LEVL V, 40-54 MIN: ICD-10-PCS | Mod: HCNC,S$GLB,, | Performed by: INTERNAL MEDICINE

## 2023-10-11 NOTE — PROGRESS NOTES
[facility]    CC: Follow up     Patient ID: Kvng Redd is a 78 y.o. male with carotid artery disease, angio-edema, former smoker, who presents for a follow up appointment.  Pertinent history/events are as follows:     -Pt kindy referred by Dr. Garcia for Preoperative Cardiac Risk Stratification Prior to Planned Bilateral Carpal tunnel Release Surgery.     -At our initial clinic visit on 8/19/2020, Mr. Redd reports no chest pain, SOB, LE edema, palpitations, TIA symptom or syncope. Can perform greater than 4 METS with no difficulty.  Formerly smoked a pack a day for 20 years.  Quit in 1980.  He is scheduled for bilateral carpal tunnel release surgery on 9/2/20 with Dr. Mayen.  EKG from 8/17/2020 shows normal sinus rhythm with nonspecific ST/T wave changes.    Plan:   Preoperative Cardiac Risk Stratification Prior to Planned Bilateral Carpal tunnel Release Surgery- Mr. Redd has no chest pain, no heart failure symptoms, and no documented arrhythmia(s).  He can perform greater than 4 METS with no difficulty.  EKG from 8/17/2020 shows normal sinus rhythm with nonspecific ST/T wave changes.  Exercise stress echo from 10/15/2018 showed normal LV systolic function with EF of 60-65%; indeterminate LV diastolic function; normal RV systolic function; estimated PA systolic pressure is 21 mmHg; no evidence of stress induced myocardial ischemia.  He is at low to moderate risk for a perioperative major adverse cardiac event during this moderate risk procedure.  Revised Cardiac Risk Index of 3.9%.  No further testing indicated.  OK to proceed with surgery.    Abnormal EKG- Pt is asymptomatic.  Exercise stress echo from 10/15/2018 showed normal LV systolic function with EF of 60-65%; indeterminate LV diastolic function; normal RV systolic function; estimated PA systolic pressure is 21 mmHg; no evidence of stress induced myocardial ischemia.   HLD- Ten year ASCVD risk score elevated at 22%.  Start ASA 81  mg daily and atorvastatin 40 mg daily.      -Mr. Redd underwent left carpal tunnel release surgery on 9/2/2020 with no complications.      -At follow up clinic visit on 11/23/2020, Mr. Redd reported doing well with no chest pain, SOB, LE edema, TIA symptoms or syncope.  Labs from 11/16/2020 shows total LDL of 55 vs 109 on 10/7/2019.    Plan:   Preoperative Cardiac Risk Stratification Prior to Planned Bilateral Carpal tunnel Release Surgery- Mr. Redd has no chest pain, no heart failure symptoms, and no documented arrhythmia(s).  He can perform greater than 4 METS with no difficulty.  EKG from 8/17/2020 shows normal sinus rhythm with nonspecific ST/T wave changes.  Exercise stress echo from 10/15/2018 showed normal LV systolic function with EF of 60-65%; indeterminate LV diastolic function; normal RV systolic function; estimated PA systolic pressure is 21 mmHg; no evidence of stress induced myocardial ischemia.  He is at low to moderate risk for a perioperative major adverse cardiac event during this moderate risk procedure.  Revised Cardiac Risk Index of 3.9%.  No further testing indicated.  OK to proceed with surgery.    Abnormal EKG- Pt is asymptomatic.  Exercise stress echo from 10/15/2018 showed normal LV systolic function with EF of 60-65%; indeterminate LV diastolic function; normal RV systolic function; estimated PA systolic pressure is 21 mmHg; no evidence of stress induced myocardial ischemia.   HLD- Ten year ASCVD risk score elevated at 22%.   Labs from 11/16/2020 shows total LDL of 55 vs 109 on 10/7/2019.  LFT's within normal limits.  Continue ASA 81 mg daily and atorvastatin 40 mg daily.      -At clinic visit on 6/2/2021, Mr. Redd reported doing well with no chest pain, SOB, LE edema, TIA symptoms or syncope.  Labs from 5/31/2021 show LDL 56 vs 55 on 11/16/2020.  Carotid Ultrasound on 5/31/2021 revealed 20-39% right Internal Carotid Stenosis; 0-19% left Internal Carotid Stenosis.   Plan:    Preoperative Cardiac Risk Stratification Prior to Planned Bilateral Carpal tunnel Release Surgery- Mr. Redd has no chest pain, no heart failure symptoms, and no documented arrhythmia(s).  He can perform greater than 4 METS with no difficulty.  EKG from 8/17/2020 shows normal sinus rhythm with nonspecific ST/T wave changes.  Exercise stress echo from 10/15/2018 showed normal LV systolic function with EF of 60-65%; indeterminate LV diastolic function; normal RV systolic function; estimated PA systolic pressure is 21 mmHg; no evidence of stress induced myocardial ischemia.  He is at low to moderate risk for a perioperative major adverse cardiac event during this moderate risk procedure.  Revised Cardiac Risk Index of 3.9%.  No further testing indicated.  OK to proceed with surgery.    Abnormal EKG- Pt is asymptomatic.  Exercise stress echo from 10/15/2018 showed normal LV systolic function with EF of 60-65%; indeterminate LV diastolic function; normal RV systolic function; estimated PA systolic pressure is 21 mmHg; no evidence of stress induced myocardial ischemia.   HLD- Ten year ASCVD risk score elevated at 22%.  Labs from 5/31/2021 show LDL 56 vs 55 on 11/16/2020.  LFT's within normal limits.  Continue ASA 81 mg daily and atorvastatin 40 mg daily.    Carotid Artery Disease- Carotid Ultrasound on 5/31/2021 revealed 20-39% right Internal Carotid Stenosis; 0-19% left Internal Carotid Stenosis.   Continue ASA 81 mg daily and atorvastatin 40 mg daily.    -At clinic visit on 9/7/2022, Mr. Redd reported doing well with no chest pain, SOB, LE edema, TIA symptoms or syncope.   Plan:   HLD- LDL 59 on 2/1/2022.  Continue ASA 81 mg daily and atorvastatin 40 mg daily.    Carotid Artery Disease- Carotid Ultrasound on 5/31/2021 revealed 20-39% right Internal Carotid Stenosis; 0-19% left Internal Carotid Stenosis.   Continue ASA 81 mg daily and atorvastatin 40 mg daily.    HPI:  Mr. Redd reports no new issues.  He  has no  chest pain, SOB, LE edema, TIA symptoms or syncope.  LDL 57 on 2/8/2023.      Past Medical History:   Diagnosis Date    Allergy     Angio-edema     BPH (benign prostatic hyperplasia)     Chronic idiopathic urticaria 5/8/2017     Past Surgical History:   Procedure Laterality Date    ADENOIDECTOMY      CARPAL TUNNEL RELEASE Bilateral     HERNIA REPAIR      right shoulder replacement      SINUS SURGERY      rhinoplasty    SPINE SURGERY      Four lumbar spine surgeries    TONSILLECTOMY       Social History     Socioeconomic History    Marital status:    Tobacco Use    Smoking status: Former     Current packs/day: 1.00     Average packs/day: 1 pack/day for 34.0 years (34.0 ttl pk-yrs)     Types: Cigarettes     Passive exposure: Past    Smokeless tobacco: Former     Quit date: 1/1/1980   Substance and Sexual Activity    Alcohol use: No    Drug use: No    Sexual activity: Yes     Partners: Female     Social Determinants of Health     Financial Resource Strain: Low Risk  (2/8/2023)    Overall Financial Resource Strain (CARDIA)     Difficulty of Paying Living Expenses: Not hard at all   Food Insecurity: No Food Insecurity (2/8/2023)    Hunger Vital Sign     Worried About Running Out of Food in the Last Year: Never true     Ran Out of Food in the Last Year: Never true   Transportation Needs: No Transportation Needs (2/8/2023)    PRAPARE - Transportation     Lack of Transportation (Medical): No     Lack of Transportation (Non-Medical): No   Physical Activity: Insufficiently Active (2/8/2023)    Exercise Vital Sign     Days of Exercise per Week: 3 days     Minutes of Exercise per Session: 30 min   Stress: No Stress Concern Present (2/8/2023)    [employer] - Occupational Stress Questionnaire     Feeling of Stress : Not at all   Social Connections: Unknown (2/8/2023)    Social Connection and Isolation Panel [NHANES]     Frequency of Communication with Friends and Family: Three times a week      Frequency of Social Gatherings with Friends and Family: Once a week     Active Member of Clubs or Organizations: Yes     Attends Club or Organization Meetings: More than 4 times per year     Marital Status:    Housing Stability: Low Risk  (2/8/2023)    Housing Stability Vital Sign     Unable to Pay for Housing in the Last Year: No     Number of Places Lived in the Last Year: 1     Unstable Housing in the Last Year: No     Family History   Problem Relation Age of Onset    Suicide Sister     Lung cancer Father     Cancer Father     Cancer Mother     Tuberculosis Mother        Review of patient's allergies indicates:   Allergen Reactions    Diagnostic aids, otherwise unspecified      Angioedema       Medication List with Changes/Refills   Current Medications    ASPIRIN (ECOTRIN) 81 MG EC TABLET    Take 81 mg by mouth once daily.    ATORVASTATIN (LIPITOR) 40 MG TABLET    Take 1 tablet (40 mg total) by mouth once daily.    CETIRIZINE (ZYRTEC) 10 MG TABLET    Take 1 tablet (10 mg total) by mouth every evening.    CYANOCOBALAMIN (VITAMIN B-12) 1000 MCG TABLET    Take 1,000 mcg by mouth once daily.     DOXAZOSIN (CARDURA) 4 MG TABLET    Take 4 mg by mouth once daily.     GLUCOSAMINE-CHONDROITIN 500-400 MG TABLET    Take 1 tablet by mouth once daily.    IBUPROFEN (ADVIL,MOTRIN) 200 MG TABLET    Take 800 mg by mouth every 6 (six) hours as needed for Pain.    MELATONIN 5 MG TBDL    Take 2 tablets by mouth every evening.    MONTELUKAST (SINGULAIR) 10 MG TABLET    Take 1 tablet (10 mg total) by mouth every evening.    MULTIVITAMIN ORAL    Take 1 tablet by mouth once daily at 6am.    TURMERIC, BULK, MISC    1,000 mg by Misc.(Non-Drug; Combo Route) route once daily.       Review of Systems  Constitution: Denies chills, fever, and sweats.  HENT: Denies headaches or blurry vision.  Cardiovascular: Denies chest pain or irregular heart beat.  Respiratory: Denies cough or shortness of breath.  Gastrointestinal: Denies abdominal  "pain, nausea, or vomiting.  Musculoskeletal: Denies muscle cramps.  Neurological: Denies dizziness or focal weakness.  Psychiatric/Behavioral: Normal mental status.  Hematologic/Lymphatic: Denies bleeding problem or easy bruising/bleeding.  Skin: Denies rash or suspicious lesions    Physical Examination  /76   Pulse 72   Ht 5' 11" (1.803 m)   Wt 83.5 kg (184 lb 1.4 oz)   BMI 25.67 kg/m²     Constitutional: No acute distress, conversant  HEENT: Sclera anicteric, Pupils equal, round and reactive to light, extraocular motions intact, Oropharynx clear  Neck: No JVD, no carotid bruits  Cardiovascular: regular rate and rhythm, no murmur, rubs or gallops, normal S1/S2  Pulmonary: Clear to auscultation bilaterally  Abdominal: Abdomen soft, nontender, nondistended, positive bowel sounds  Extremities: No lower extremity edema,   Pulses:  Carotid pulses are 2+ on the right side, and 2+ on the left side.  Radial pulses are 2+ on the right side, and 2+ on the left side.   Femoral pulses are 2+ on the right side, and 2+ on the left side.  Popliteal pulses are 2+ on the right side, and 2+ on the left side.   Dorsalis pedis pulses are 2+ on the right side, and 2+ on the left side.   Posterior tibial pulses are 2+ on the right side, and 2+ on the left side.    Skin: No ecchymosis, erythema, or ulcers  Psych: Alert and oriented x 3, appropriate affect  Neuro: CNII-XII intact, no focal deficits    Labs:  Most Recent Data  CBC:   Lab Results   Component Value Date    WBC 5.22 02/08/2023    HGB 12.7 (L) 02/08/2023    HCT 38.9 (L) 02/08/2023     02/08/2023    MCV 90 02/08/2023    RDW 12.8 02/08/2023     BMP:   Lab Results   Component Value Date     02/08/2023    K 4.0 02/08/2023     02/08/2023    CO2 23 02/08/2023    BUN 18 02/08/2023    CREATININE 1.0 02/08/2023    GLU 90 02/08/2023    CALCIUM 9.5 02/08/2023     LFTS;   Lab Results   Component Value Date    PROT 6.7 02/08/2023    ALBUMIN 3.9 02/08/2023    " "BILITOT 0.6 02/08/2023    AST 30 02/08/2023    ALKPHOS 78 02/08/2023    ALT 26 02/08/2023     COAGS: No results found for: "INR", "PROTIME", "PTT"  FLP:   Lab Results   Component Value Date    CHOL 117 (L) 02/08/2023    HDL 48 02/08/2023    LDLCALC 57.6 (L) 02/08/2023    TRIG 57 02/08/2023    CHOLHDL 41.0 02/08/2023     CARDIAC: No results found for: "TROPONINI", "CKTOTAL", "CKMB", "BNP"    EKG 8/17/2020:  Normal sinus rhythm  Nonspecific ST/T wave changes    Carotid Ultrasound 5/31/2021:  There is 20-39% right Internal Carotid Stenosis.  There is 0-19% left Internal Carotid Stenosis.    Exercise Stress Echo 10/15/2018:  CONCLUSIONS     1 - Normal left ventricular systolic function (EF 60-65%).     2 - No wall motion abnormalities.     3 - Indeterminate LV diastolic function.     4 - Normal right ventricular systolic function .     5 - The estimated PA systolic pressure is 21 mmHg.   No evidence of stress induced myocardial ischemia.     Assessment/Plan:  Kvng Redd is a 78 y.o. male with carotid artery disease, angio-edema, former smoker, who presents for a follow up appointment.     1.  HLD- LDL 57 on 2/8/2023.  Continue ASA 81 mg daily and atorvastatin 40 mg daily.      2. Carotid Artery Disease- Carotid Ultrasound on 5/31/2021 revealed 20-39% right Internal Carotid Stenosis; 0-19% left Internal Carotid Stenosis.   Continue ASA 81 mg daily and atorvastatin 40 mg daily.  Check updated carotid ultrasound.     Will call pt with results of carotid ultrasound  Otherwise, follow up in 1 year     Total duration of face to face visit time 30 minutes.  Total time spent counseling greater than fifty percent of total visit time.  Counseling included discussion regarding imaging findings, diagnosis, possibilities, treatment options, risks and benefits.  The patient had many questions regarding the options and long-term effects.    Hemanth Gerardo MD, PhD  Interventional Cardiology        "

## 2023-11-13 ENCOUNTER — HOSPITAL ENCOUNTER (OUTPATIENT)
Dept: CARDIOLOGY | Facility: HOSPITAL | Age: 78
Discharge: HOME | End: 2023-11-13
Attending: INTERNAL MEDICINE
Payer: MEDICARE

## 2023-11-13 DIAGNOSIS — I65.23 BILATERAL CAROTID ARTERY STENOSIS: ICD-10-CM

## 2023-11-13 LAB
LEFT ARM DIASTOLIC BLOOD PRESSURE: 60 MMHG
LEFT ARM SYSTOLIC BLOOD PRESSURE: 110 MMHG
LEFT CBA DIAS: 14 CM/S
LEFT CBA SYS: 121 CM/S
LEFT CCA DIST DIAS: 18 CM/S
LEFT CCA DIST SYS: 117 CM/S
LEFT CCA MID DIAS: 21 CM/S
LEFT CCA MID SYS: 122 CM/S
LEFT CCA PROX DIAS: 16 CM/S
LEFT CCA PROX SYS: 103 CM/S
LEFT ECA DIAS: 13 CM/S
LEFT ECA SYS: 138 CM/S
LEFT ICA DIST DIAS: 25 CM/S
LEFT ICA DIST SYS: 97 CM/S
LEFT ICA MID DIAS: 13 CM/S
LEFT ICA MID SYS: 99 CM/S
LEFT ICA PROX DIAS: 17 CM/S
LEFT ICA PROX SYS: 92 CM/S
LEFT VERTEBRAL DIAS: 13 CM/S
LEFT VERTEBRAL SYS: 78 CM/S
OHS CV CAROTID RIGHT ICA EDV HIGHEST: 30
OHS CV CAROTID ULTRASOUND LEFT ICA/CCA RATIO: 0.85
OHS CV CAROTID ULTRASOUND RIGHT ICA/CCA RATIO: 2.05
OHS CV PV CAROTID LEFT HIGHEST CCA: 122
OHS CV PV CAROTID LEFT HIGHEST ICA: 99
OHS CV PV CAROTID RIGHT HIGHEST CCA: 95
OHS CV PV CAROTID RIGHT HIGHEST ICA: 158
OHS CV US CAROTID LEFT HIGHEST EDV: 25
RIGHT ARM DIASTOLIC BLOOD PRESSURE: 60 MMHG
RIGHT ARM SYSTOLIC BLOOD PRESSURE: 110 MMHG
RIGHT CBA DIAS: 14 CM/S
RIGHT CBA SYS: 124 CM/S
RIGHT CCA DIST DIAS: 13 CM/S
RIGHT CCA DIST SYS: 77 CM/S
RIGHT CCA MID DIAS: 12 CM/S
RIGHT CCA MID SYS: 87 CM/S
RIGHT CCA PROX DIAS: 15 CM/S
RIGHT CCA PROX SYS: 95 CM/S
RIGHT ECA DIAS: 50 CM/S
RIGHT ECA SYS: 202 CM/S
RIGHT ICA DIST DIAS: 30 CM/S
RIGHT ICA DIST SYS: 149 CM/S
RIGHT ICA MID DIAS: 24 CM/S
RIGHT ICA MID SYS: 153 CM/S
RIGHT ICA PROX DIAS: 27 CM/S
RIGHT ICA PROX SYS: 158 CM/S
RIGHT VERTEBRAL DIAS: 9 CM/S
RIGHT VERTEBRAL SYS: 71 CM/S

## 2023-11-13 PROCEDURE — 93880 EXTRACRANIAL BILAT STUDY: CPT | Mod: 26,HCNC,, | Performed by: INTERNAL MEDICINE

## 2023-11-13 PROCEDURE — 93880 EXTRACRANIAL BILAT STUDY: CPT | Mod: HCNC,PO

## 2023-11-13 PROCEDURE — 93880 CV US DOPPLER CAROTID (CUPID ONLY): ICD-10-PCS | Mod: 26,HCNC,, | Performed by: INTERNAL MEDICINE

## 2023-12-07 ENCOUNTER — TELEPHONE (OUTPATIENT)
Dept: INTERNAL MEDICINE | Facility: CLINIC | Age: 78
End: 2023-12-07
Payer: MEDICARE

## 2023-12-07 NOTE — TELEPHONE ENCOUNTER
----- Message from Inna Mitchell sent at 12/7/2023 11:01 AM CST -----  Contact: [phone]  Pt's wife called to advise that she called [facility] and the pt has had his pneumonia vaccine. Please Advise

## 2024-01-03 RX ORDER — ATORVASTATIN CALCIUM 40 MG/1
40 TABLET, FILM COATED ORAL
Qty: 90 TABLET | Refills: 3 | Status: SHIPPED | OUTPATIENT
Start: 2024-01-03

## 2024-02-19 ENCOUNTER — LAB VISIT (OUTPATIENT)
Dept: LAB | Facility: HOSPITAL | Age: 79
End: 2024-02-19
Attending: INTERNAL MEDICINE
Payer: MEDICARE

## 2024-02-19 DIAGNOSIS — Z00.00 ANNUAL PHYSICAL EXAM: ICD-10-CM

## 2024-02-19 DIAGNOSIS — Z00.00 PREVENTATIVE HEALTH CARE: ICD-10-CM

## 2024-02-19 DIAGNOSIS — E78.2 MIXED HYPERLIPIDEMIA: ICD-10-CM

## 2024-02-19 DIAGNOSIS — R73.9 ELEVATED BLOOD SUGAR: ICD-10-CM

## 2024-02-19 LAB
BACTERIA #/AREA URNS AUTO: NORMAL /HPF
BILIRUB UR QL STRIP: NEGATIVE
CLARITY UR REFRACT.AUTO: CLEAR
COLOR UR AUTO: YELLOW
GLUCOSE UR QL STRIP: NEGATIVE
HGB UR QL STRIP: NEGATIVE
KETONES UR QL STRIP: NEGATIVE
LEUKOCYTE ESTERASE UR QL STRIP: NEGATIVE
MICROSCOPIC COMMENT: NORMAL
NITRITE UR QL STRIP: NEGATIVE
PH UR STRIP: 6 [PH] (ref 5–8)
PROT UR QL STRIP: NEGATIVE
RBC #/AREA URNS AUTO: 0 /HPF (ref 0–4)
SP GR UR STRIP: 1.01 (ref 1–1.03)
URN SPEC COLLECT METH UR: NORMAL
WBC #/AREA URNS AUTO: 2 /HPF (ref 0–5)

## 2024-02-19 PROCEDURE — 81003 URINALYSIS AUTO W/O SCOPE: CPT | Mod: HCNC | Performed by: INTERNAL MEDICINE

## 2024-02-19 PROCEDURE — 81001 URINALYSIS AUTO W/SCOPE: CPT | Mod: HCNC | Performed by: INTERNAL MEDICINE

## 2024-02-23 ENCOUNTER — OFFICE VISIT (OUTPATIENT)
Dept: INTERNAL MEDICINE | Facility: CLINIC | Age: 79
End: 2024-02-23
Payer: MEDICARE

## 2024-02-23 VITALS
WEIGHT: 184.75 LBS | BODY MASS INDEX: 25.86 KG/M2 | DIASTOLIC BLOOD PRESSURE: 68 MMHG | HEART RATE: 76 BPM | SYSTOLIC BLOOD PRESSURE: 120 MMHG | HEIGHT: 71 IN | TEMPERATURE: 97 F | OXYGEN SATURATION: 98 %

## 2024-02-23 DIAGNOSIS — N40.0 BENIGN PROSTATIC HYPERPLASIA, UNSPECIFIED WHETHER LOWER URINARY TRACT SYMPTOMS PRESENT: ICD-10-CM

## 2024-02-23 DIAGNOSIS — E78.2 MIXED HYPERLIPIDEMIA: ICD-10-CM

## 2024-02-23 DIAGNOSIS — Z00.00 ANNUAL PHYSICAL EXAM: Primary | ICD-10-CM

## 2024-02-23 PROCEDURE — 3288F FALL RISK ASSESSMENT DOCD: CPT | Mod: HCNC,CPTII,S$GLB, | Performed by: INTERNAL MEDICINE

## 2024-02-23 PROCEDURE — 3074F SYST BP LT 130 MM HG: CPT | Mod: HCNC,CPTII,S$GLB, | Performed by: INTERNAL MEDICINE

## 2024-02-23 PROCEDURE — 3078F DIAST BP <80 MM HG: CPT | Mod: HCNC,CPTII,S$GLB, | Performed by: INTERNAL MEDICINE

## 2024-02-23 PROCEDURE — 99397 PER PM REEVAL EST PAT 65+ YR: CPT | Mod: HCNC,S$GLB,, | Performed by: INTERNAL MEDICINE

## 2024-02-23 PROCEDURE — 1101F PT FALLS ASSESS-DOCD LE1/YR: CPT | Mod: HCNC,CPTII,S$GLB, | Performed by: INTERNAL MEDICINE

## 2024-02-23 PROCEDURE — 1159F MED LIST DOCD IN RCRD: CPT | Mod: HCNC,CPTII,S$GLB, | Performed by: INTERNAL MEDICINE

## 2024-02-23 PROCEDURE — 99999 PR PBB SHADOW E&M-EST. PATIENT-LVL III: CPT | Mod: PBBFAC,HCNC,, | Performed by: INTERNAL MEDICINE

## 2024-02-23 PROCEDURE — 1160F RVW MEDS BY RX/DR IN RCRD: CPT | Mod: HCNC,CPTII,S$GLB, | Performed by: INTERNAL MEDICINE

## 2024-02-23 PROCEDURE — 1126F AMNT PAIN NOTED NONE PRSNT: CPT | Mod: HCNC,CPTII,S$GLB, | Performed by: INTERNAL MEDICINE

## 2024-02-23 NOTE — PROGRESS NOTES
Subjective     Patient ID: Kvng Redd is a 78 y.o. male.    Chief Complaint: Annual Exam    HPI  78 y.o. Male here for annual exam.      Vaccines: Influenza (2022); Tetanus (2018); Pneumovax (current); Shingrix (2019)  Eye exam: 2018  Colonoscopy: 6/19  DEXA: declined     Exercise: walks  Diet: regular     Past Medical History:  No date: Allergy  No date: Angio-edema  No date: BPH (benign prostatic hyperplasia)  5/8/2017: Chronic idiopathic urticaria  Past Surgical History:  No date: ADENOIDECTOMY  No date: HERNIA REPAIR  No date: right shoulder replacement  No date: SINUS SURGERY      Comment:  rhinoplasty  No date: SPINE SURGERY  No date: TONSILLECTOMY  No date: B/L Carpal tunnel release   Social History    Socioeconomic History      Marital status:       Spouse name: Not on file      Number of children: Not on file      Years of education: Not on file      Highest education level: Not on file    Social Needs      Financial resource strain: Not on file      Food insecurity - worry: Not on file      Food insecurity - inability: Not on file      Transportation needs - medical: Not on file      Transportation needs - non-medical: Not on file    Occupational History      Hairdresser     Tobacco Use      Smoking status: Former Smoker        Packs/day: 1.00        Years: 34.00        Pack years: 34        Types: Cigarettes      Smokeless tobacco: Former User        Quit date: 1/1/1980    Substance and Sexual Activity      Alcohol use: No      Drug use: No      Sexual activity: Yes        Partners: Female     Review of patient's allergies indicates:  No Known Allergies  Review of Systems   Constitutional:  Negative for activity change, appetite change, chills, diaphoresis, fatigue, fever and unexpected weight change.   HENT:  Negative for nasal congestion, mouth sores, postnasal drip, rhinorrhea, sinus pressure/congestion, sneezing, sore throat, trouble swallowing and voice change.    Eyes:  Negative for  discharge, itching and visual disturbance.   Respiratory:  Negative for cough, chest tightness, shortness of breath and wheezing.    Cardiovascular:  Negative for chest pain, palpitations and leg swelling.   Gastrointestinal:  Negative for abdominal pain, blood in stool, constipation, diarrhea, nausea and vomiting.   Endocrine: Negative for cold intolerance and heat intolerance.   Genitourinary:  Negative for difficulty urinating, dysuria, flank pain, hematuria and urgency.   Musculoskeletal:  Negative for arthralgias, back pain, myalgias and neck pain.   Integumentary:  Negative for rash and wound.   Allergic/Immunologic: Negative for environmental allergies and food allergies.   Neurological:  Negative for dizziness, tremors, seizures, syncope, weakness and headaches.   Hematological:  Negative for adenopathy. Does not bruise/bleed easily.   Psychiatric/Behavioral:  Negative for confusion, sleep disturbance and suicidal ideas. The patient is not nervous/anxious.           Objective     Physical Exam  Constitutional:       General: He is not in acute distress.     Appearance: Normal appearance. He is well-developed. He is not ill-appearing, toxic-appearing or diaphoretic.   HENT:      Head: Normocephalic and atraumatic.      Right Ear: External ear normal.      Left Ear: External ear normal.      Nose: Nose normal.      Mouth/Throat:      Pharynx: No oropharyngeal exudate.   Eyes:      General: No scleral icterus.        Right eye: No discharge.         Left eye: No discharge.      Extraocular Movements: Extraocular movements intact.      Conjunctiva/sclera: Conjunctivae normal.      Pupils: Pupils are equal, round, and reactive to light.   Neck:      Thyroid: No thyromegaly.      Vascular: No JVD.   Cardiovascular:      Rate and Rhythm: Normal rate and regular rhythm.      Pulses: Normal pulses.      Heart sounds: Normal heart sounds. No murmur heard.  Pulmonary:      Effort: Pulmonary effort is normal. No  respiratory distress.      Breath sounds: Normal breath sounds. No wheezing or rales.   Abdominal:      General: Bowel sounds are normal. There is no distension.      Palpations: Abdomen is soft.      Tenderness: There is no abdominal tenderness. There is no right CVA tenderness, left CVA tenderness, guarding or rebound.   Musculoskeletal:      Cervical back: Normal range of motion and neck supple. No rigidity.      Right lower leg: No edema.      Left lower leg: No edema.   Lymphadenopathy:      Cervical: No cervical adenopathy.   Skin:     General: Skin is warm and dry.      Capillary Refill: Capillary refill takes less than 2 seconds.      Coloration: Skin is not pale.      Findings: No rash.   Neurological:      General: No focal deficit present.      Mental Status: He is alert and oriented to person, place, and time. Mental status is at baseline.      Cranial Nerves: No cranial nerve deficit.      Sensory: No sensory deficit.      Motor: No weakness.      Coordination: Coordination normal.      Gait: Gait normal.      Deep Tendon Reflexes: Reflexes normal.   Psychiatric:         Mood and Affect: Mood normal.         Behavior: Behavior normal.         Thought Content: Thought content normal.         Judgment: Judgment normal.            Assessment and Plan     1. Annual physical exam    2. Benign prostatic hyperplasia, unspecified whether lower urinary tract symptoms present    3. Mixed hyperlipidemia         Blood work reviewed with pt      BPH- stable on Cardura      HLD- on Lipitor      F/u in 1 yr

## 2024-08-16 ENCOUNTER — TELEPHONE (OUTPATIENT)
Dept: ALLERGY | Facility: CLINIC | Age: 79
End: 2024-08-16
Payer: MEDICARE

## 2024-08-16 RX ORDER — MONTELUKAST SODIUM 10 MG/1
10 TABLET ORAL NIGHTLY
Qty: 30 TABLET | Refills: 0 | Status: SHIPPED | OUTPATIENT
Start: 2024-08-16

## 2024-08-16 NOTE — TELEPHONE ENCOUNTER
I have sent in one month refill but since has been over a year he needs to be seen for further refills

## 2024-08-16 NOTE — TELEPHONE ENCOUNTER
----- Message from Anna Beaver LPN sent at 8/16/2024  1:26 PM CDT -----  Contact: [phone]  Please advise.  ----- Message -----  From: Bimal Alexander LPN  Sent: 8/16/2024  10:30 AM CDT  To: Anna Beaver LPN      ----- Message -----  From: Alem Reynoso  Sent: 8/16/2024  10:09 AM CDT  To: [facility] Staff    Requesting an RX refill or new RX.    Is this a refill or new RX: Refill 1    RX name and strength (copy/paste from chart):  montelukast (SINGULAIR) 10 mg tablet    Is this a 30 day or 90 day RX:     Pharmacy name and phone # (copy/paste from chart): [facility] - [facility] [address] [place]   Phone: [phone]  Fax: [phone]           The doctors have asked that we provide their patients with the following 2 reminders -- prescription refills can take up to 72 hours, and a friendly reminder that in the future you can use your [facility] account to request refills:

## 2024-08-20 ENCOUNTER — TELEPHONE (OUTPATIENT)
Dept: ALLERGY | Facility: CLINIC | Age: 79
End: 2024-08-20
Payer: MEDICARE

## 2024-08-29 ENCOUNTER — OFFICE VISIT (OUTPATIENT)
Dept: ALLERGY | Facility: CLINIC | Age: 79
End: 2024-08-29
Payer: MEDICARE

## 2024-08-29 VITALS — BODY MASS INDEX: 25.68 KG/M2 | WEIGHT: 183.44 LBS | HEIGHT: 71 IN

## 2024-08-29 DIAGNOSIS — J30.89 ALLERGIC RHINITIS DUE TO DUST MITE: ICD-10-CM

## 2024-08-29 DIAGNOSIS — L50.8 CHRONIC URTICARIA: ICD-10-CM

## 2024-08-29 DIAGNOSIS — T78.3XXD ANGIOEDEMA, SUBSEQUENT ENCOUNTER: Primary | ICD-10-CM

## 2024-08-29 DIAGNOSIS — L29.9 PRURITUS: ICD-10-CM

## 2024-08-29 PROCEDURE — 1160F RVW MEDS BY RX/DR IN RCRD: CPT | Mod: HCNC,CPTII,S$GLB, | Performed by: ALLERGY & IMMUNOLOGY

## 2024-08-29 PROCEDURE — 1126F AMNT PAIN NOTED NONE PRSNT: CPT | Mod: HCNC,CPTII,S$GLB, | Performed by: ALLERGY & IMMUNOLOGY

## 2024-08-29 PROCEDURE — 1159F MED LIST DOCD IN RCRD: CPT | Mod: HCNC,CPTII,S$GLB, | Performed by: ALLERGY & IMMUNOLOGY

## 2024-08-29 PROCEDURE — 99999 PR PBB SHADOW E&M-EST. PATIENT-LVL III: CPT | Mod: PBBFAC,HCNC,, | Performed by: ALLERGY & IMMUNOLOGY

## 2024-08-29 PROCEDURE — 99215 OFFICE O/P EST HI 40 MIN: CPT | Mod: HCNC,S$GLB,, | Performed by: ALLERGY & IMMUNOLOGY

## 2024-08-29 RX ORDER — MONTELUKAST SODIUM 10 MG/1
10 TABLET ORAL NIGHTLY
Qty: 90 TABLET | Refills: 4 | Status: SHIPPED | OUTPATIENT
Start: 2024-08-29

## 2024-08-29 NOTE — PROGRESS NOTES
Chief Complaint: Follow-up        Subjective:         HPI    Kvng Redd is a 78 y.o. male here for continued evaluation of angioedema, allergic rhinitis and prior history of hives. He was last seen 6/13/2023. He states this started in 2016. He saw [facility] and had labs and all normal. Then had skin test inhalants positive to dust mites and skin test foods all negative. 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 He takes montelukast 10 mg and cetirizine 10 mg every night (weaned down from 20 mg and still controlled). This keeps it controlled. He no longer feels tingling in lips and never swells. Prior to meds had frequent lip, eye and facial swelling. He denies hives but was reported in prior notes. He has not missed meds and is well controlled. He has no rhinitis on this regimen, no congestion, runny nose or sneeze. He used to get sneeze and runny nose often, feels zyrtec controls it. No cough SOB or wheeze.          Review of Systems  Constitutional: Negative for changes in appetite, unintentional weight loss, fever, chills and fatigue.   HENT: Negative for facial pain, nose bleeds, nasal congestion, postnasal drip, throat clearing, sinus pressure, and voice change. Negative for ear discharge, ear pain, facial swelling, sore throat and trouble swallowing.  Eyes: Negative for occular discharge, redness, itching and visual disturbance.  Respiratory: Negative for chest tightness, shortness of breath, wheezing, dyspnea on exertion, sputum production and cough.   Cardiovascular: Negative for chest pain, palpitations and leg swelling.  Gastrointestinal: Negative for abdominal distension, abdominal pain, constipation, diarrhea, nausea,and vomiting.   Genitourinary: Negative for difficulty urinating.   Musculoskeletal: Negative for arthralgias, gait problems, joint swelling, myalgias and back pain.   Neurological: Negative for dizziness, syncope, weakness, light-headedness, and headaches.   Hematological: Negative for  adenopathy, does not bruise or bleed easily.  Psychiatric/Behavioral: Negative for agitation, anxiety, behavioral problems, confusion, and insomnia.  Skin: Positive for angioedema as above.     PMH:  Reviewed with the patient and current for this visit    Family History:  Reviewed with the patient and current for this visit    Social History:  Reviewed with the patient and current for this visit     Environmental History:  Reviewed with the patient and current for this visit          Objective:      Skin Test results: Positive to house dust mites; test was prick skin test done on 4/28/2016.  Skin test for foods done on the same day was negative.The positive histamine control (3+) and the negative saline control (0), indicate that this was a valid assessment of allergic recognition.     Immunotherapy: Patient has not been on allergen specific immunotherapy     Physical Exam  General:patient is well developed and well nourished, in no acute distress.  Mental Status:  Alert, oriented and cooperative  Head and Face: normocephalic   Allergic shiners: No  Eyes:   Scleral conjunctiva: clear; Cornea: clear; Palprebal conjunctiva: normal: Eyelid Skin: normal  Lungs: Air movement is good; respiratory effort is good; no respiratory distress; no cough.  Extremities: no cyanosis, clubbing or edema  Skin:no rashes or lesions present; skin hydrated and supple.         Assessment:       1. Angioedema, subsequent encounter        2. Chronic urticaria        3. Allergic rhinitis due to dust mite        4. Pruritus                Plan:         1. continue cetirizine 10 mg nightly.  2. Montelukast 10 mg daily  3. benadryl as needed if flares  4. RTC annually or sooner if needed     I spent a total of 40 minutes on the day of the visit.  This includes face to face time and non-face to face time preparing to see the patient (eg, review of tests), obtaining and/or reviewing separately obtained history, documenting clinical information in  the electronic or other health record, independently interpreting results and communicating results to the patient/family/caregiver, or care coordinator.         Ya Bettencourt MD

## 2024-09-09 RX ORDER — MONTELUKAST SODIUM 10 MG/1
10 TABLET ORAL NIGHTLY
Qty: 30 TABLET | Refills: 11 | Status: SHIPPED | OUTPATIENT
Start: 2024-09-09

## 2024-10-01 ENCOUNTER — PATIENT MESSAGE (OUTPATIENT)
Dept: INTERNAL MEDICINE | Facility: CLINIC | Age: 79
End: 2024-10-01
Payer: MEDICARE

## 2024-10-21 RX ORDER — ATORVASTATIN CALCIUM 40 MG/1
40 TABLET, FILM COATED ORAL
Qty: 90 TABLET | Refills: 3 | Status: SHIPPED | OUTPATIENT
Start: 2024-10-21

## 2024-10-27 ENCOUNTER — OFFICE VISIT (OUTPATIENT)
Dept: URGENT CARE | Facility: CLINIC | Age: 79
End: 2024-10-27
Payer: MEDICARE

## 2024-10-27 VITALS
OXYGEN SATURATION: 96 % | DIASTOLIC BLOOD PRESSURE: 73 MMHG | HEART RATE: 77 BPM | SYSTOLIC BLOOD PRESSURE: 126 MMHG | HEIGHT: 71 IN | BODY MASS INDEX: 25.68 KG/M2 | WEIGHT: 183.44 LBS | RESPIRATION RATE: 17 BRPM | TEMPERATURE: 98 F

## 2024-10-27 DIAGNOSIS — S51.812A LACERATION OF LEFT FOREARM, INITIAL ENCOUNTER: Primary | ICD-10-CM

## 2024-10-27 DIAGNOSIS — T14.90XA TRAUMA: ICD-10-CM

## 2024-10-27 PROCEDURE — 73090 X-RAY EXAM OF FOREARM: CPT | Mod: FY,LT,S$GLB, | Performed by: RADIOLOGY

## 2024-10-27 PROCEDURE — 90471 IMMUNIZATION ADMIN: CPT | Mod: S$GLB,,,

## 2024-10-27 PROCEDURE — 99499 UNLISTED E&M SERVICE: CPT | Mod: S$GLB,,,

## 2024-10-27 PROCEDURE — 90714 TD VACC NO PRESV 7 YRS+ IM: CPT | Mod: S$GLB,,,

## 2024-10-27 PROCEDURE — 12002 RPR S/N/AX/GEN/TRNK2.6-7.5CM: CPT | Mod: S$GLB,,,

## 2024-10-27 RX ORDER — MUPIROCIN 20 MG/G
OINTMENT TOPICAL 3 TIMES DAILY
Qty: 15 G | Refills: 0 | Status: SHIPPED | OUTPATIENT
Start: 2024-10-27 | End: 2024-10-29

## 2024-11-04 ENCOUNTER — OFFICE VISIT (OUTPATIENT)
Dept: URGENT CARE | Facility: CLINIC | Age: 79
End: 2024-11-04
Payer: MEDICARE

## 2024-11-04 VITALS
HEIGHT: 71 IN | DIASTOLIC BLOOD PRESSURE: 69 MMHG | TEMPERATURE: 98 F | OXYGEN SATURATION: 96 % | SYSTOLIC BLOOD PRESSURE: 121 MMHG | BODY MASS INDEX: 25.62 KG/M2 | WEIGHT: 183 LBS | HEART RATE: 70 BPM | RESPIRATION RATE: 16 BRPM

## 2024-11-04 DIAGNOSIS — Z48.02 VISIT FOR SUTURE REMOVAL: Primary | ICD-10-CM

## 2024-11-04 PROCEDURE — 99499 UNLISTED E&M SERVICE: CPT | Mod: S$GLB,,, | Performed by: PHYSICIAN ASSISTANT

## 2024-11-04 PROCEDURE — 99024 POSTOP FOLLOW-UP VISIT: CPT | Mod: S$GLB,,, | Performed by: PHYSICIAN ASSISTANT

## 2024-11-04 RX ORDER — MUPIROCIN 20 MG/G
OINTMENT TOPICAL
Status: COMPLETED | OUTPATIENT
Start: 2024-11-04 | End: 2024-11-04

## 2024-11-04 RX ADMIN — MUPIROCIN: 20 OINTMENT TOPICAL at 10:11

## 2024-11-04 NOTE — PROGRESS NOTES
"Subjective:      Patient ID: Kvng Redd is a 79 y.o. male.    Vitals:  height is 5' 11" (1.803 m) and weight is 83 kg (183 lb). His oral temperature is 98.3 °F (36.8 °C). His blood pressure is 121/69 and his pulse is 70. His respiration is 16 and oxygen saturation is 96%.     Chief Complaint: Suture / Staple Removal    This is a 79 y.o. male who presents today for stitches removal on arm. No pain, slight redness, no swelling. Use antibiotic ointment for 2 days.  Patient had 4 sutures placed has had no complications    Suture / Staple Removal  The sutures were placed 7 to 10 days ago. He tried antibiotic ointment use since the wound repair. The treatment provided significant relief. His temperature was unmeasured prior to arrival. There has been no drainage from the wound. The redness has improved. There is no swelling present. There is no pain present. He has no difficulty moving the affected extremity or digit.     Constitution: Negative for chills and fever.   Musculoskeletal:  Negative for pain and muscle ache.   Skin:  Negative for rash, erythema and bruising.   Neurological:  Negative for numbness and tingling.      Past Medical History:   Diagnosis Date    Allergy     Angio-edema     BPH (benign prostatic hyperplasia)     Chronic idiopathic urticaria 5/8/2017       Past Surgical History:   Procedure Laterality Date    ADENOIDECTOMY      CARPAL TUNNEL RELEASE Bilateral     HERNIA REPAIR      right shoulder replacement      SINUS SURGERY      rhinoplasty    SPINE SURGERY      Four lumbar spine surgeries    TONSILLECTOMY         Family History   Problem Relation Name Age of Onset    Suicide Sister      Lung cancer Father      Cancer Father      Cancer Mother      Tuberculosis Mother         Social History     Socioeconomic History    Marital status:    Tobacco Use    Smoking status: Former     Current packs/day: 1.00     Average packs/day: 1 pack/day for 34.0 years (34.0 ttl pk-yrs)     Types: " Cigarettes     Passive exposure: Past    Smokeless tobacco: Former     Quit date: 1/1/1980   Substance and Sexual Activity    Alcohol use: No    Drug use: No    Sexual activity: Yes     Partners: Female     Social Drivers of Health     Financial Resource Strain: Low Risk  (2/8/2023)    Overall Financial Resource Strain (CARDIA)     Difficulty of Paying Living Expenses: Not hard at all   Food Insecurity: No Food Insecurity (2/8/2023)    Hunger Vital Sign     Worried About Running Out of Food in the Last Year: Never true     Ran Out of Food in the Last Year: Never true   Transportation Needs: No Transportation Needs (2/8/2023)    PRAPARE - Transportation     Lack of Transportation (Medical): No     Lack of Transportation (Non-Medical): No   Physical Activity: Insufficiently Active (2/8/2023)    Exercise Vital Sign     Days of Exercise per Week: 3 days     Minutes of Exercise per Session: 30 min   Stress: No Stress Concern Present (2/8/2023)    Citizen of [place] [employer] - Occupational Stress Questionnaire     Feeling of Stress : Not at all   Housing Stability: Low Risk  (2/8/2023)    Housing Stability Vital Sign     Unable to Pay for Housing in the Last Year: No     Number of Places Lived in the Last Year: 1     Unstable Housing in the Last Year: No       Current Outpatient Medications   Medication Sig Dispense Refill    aspirin (ECOTRIN) 81 MG EC tablet Take 81 mg by mouth once daily.      atorvastatin (LIPITOR) 40 MG tablet TAKE 1 TABLET EVERY DAY 90 tablet 3    cetirizine (ZYRTEC) 10 MG tablet Take 1 tablet (10 mg total) by mouth every evening. 90 tablet 4    cyanocobalamin (VITAMIN B-12) 1000 MCG tablet Take 1,000 mcg by mouth once daily.       doxazosin (CARDURA) 4 MG tablet Take 4 mg by mouth once daily.       ibuprofen (ADVIL,MOTRIN) 200 MG tablet Take 800 mg by mouth every 6 (six) hours as needed for Pain.      melatonin 5 mg TbDL Take 2 tablets by mouth every evening.      montelukast  (SINGULAIR) 10 mg tablet TAKE 1 TABLET EVERY EVENING 30 tablet 11    MULTIVITAMIN ORAL Take 1 tablet by mouth once daily at 6am.      omeprazole (PRILOSEC) 20 MG capsule Take 1 capsule (20 mg total) by mouth once daily. 90 capsule 3    TURMERIC, BULK, MISC 1,000 mg by Misc.(Non-Drug; Combo Route) route once daily.      glucosamine-chondroitin 500-400 mg tablet Take 1 tablet by mouth once daily. (Patient not taking: Reported on 11/4/2024)       Current Facility-Administered Medications   Medication Dose Route Frequency Provider Last Rate Last Admin    mupirocin 2 % ointment   Topical (Top) 1 time in Clinic/HOD Sandee Holcomb PA-C           Review of patient's allergies indicates:   Allergen Reactions    Diagnostic aids, otherwise unspecified      Angioedema       Objective:     Physical Exam   Constitutional:  Non-toxic appearance. He does not appear ill. No distress.   Pulmonary/Chest: Effort normal. No respiratory distress.   Abdominal: Normal appearance.   Neurological: He is alert. He displays no weakness. No sensory deficit.   Skin: Skin is warm, dry, not diaphoretic, not pale and no rash. Capillary refill takes less than 2 seconds. No bruising and No erythema        Nursing note and vitals reviewed.    Suture Removal    Date/Time: 11/4/2024 9:30 AM  Location procedure was performed: [facility]    Performed by: Sandee Holcomb PA-C  Authorized by: Sandee Holcomb PA-C  Body area: upper extremity  Location details: left lower arm  Wound Appearance: clean, well healed, normal color, nontender and no drainage  Sutures Removed: 4  Staples Removed: 0  Post-removal: bandaid applied and antibiotic ointment applied  Facility: sutures placed in this facility  Complications: No  Patient tolerance: Patient tolerated the procedure well with no immediate complications          Assessment:     1. Visit for suture removal        Plan:       Visit for suture removal  -     mupirocin  2 % ointment  -     Suture Removal      Patient Instructions   For stitches removed today.  Keep the bandage on for 24 hours as we discussed.  Afterwards you can take it off wash with warm soap and water do not use topical alcohol or hydrogen peroxide.  Keep an eye out for signs of infection such as fever, chills, pain, discharge or increased redness.  Follow up as needed  I have reviewed the patient chart and pertinent past imaging/labs.

## 2024-11-04 NOTE — PROCEDURES
Suture Removal    Date/Time: 11/4/2024 9:30 AM  Location procedure was performed: [facility]    Performed by: Sandee Holcomb PA-C  Authorized by: Sandee Holcomb PA-C  Body area: upper extremity  Location details: left lower arm  Wound Appearance: clean, well healed, normal color, nontender and no drainage  Sutures Removed: 4  Staples Removed: 0  Post-removal: bandaid applied and antibiotic ointment applied  Facility: sutures placed in this facility  Complications: No  Patient tolerance: Patient tolerated the procedure well with no immediate complications

## 2024-11-04 NOTE — PATIENT INSTRUCTIONS
For stitches removed today.  Keep the bandage on for 24 hours as we discussed.  Afterwards you can take it off wash with warm soap and water do not use topical alcohol or hydrogen peroxide.  Keep an eye out for signs of infection such as fever, chills, pain, discharge or increased redness.  Follow up as needed

## 2024-12-13 DIAGNOSIS — K21.9 GASTROESOPHAGEAL REFLUX DISEASE, UNSPECIFIED WHETHER ESOPHAGITIS PRESENT: ICD-10-CM

## 2024-12-13 RX ORDER — OMEPRAZOLE 20 MG/1
20 CAPSULE, DELAYED RELEASE ORAL
Qty: 90 CAPSULE | Refills: 0 | Status: SHIPPED | OUTPATIENT
Start: 2024-12-13

## 2024-12-13 NOTE — TELEPHONE ENCOUNTER
Refill Decision Note   Kvng Ivania  is requesting a refill authorization.  Brief Assessment and Rationale for Refill:  Approve     Medication Therapy Plan:         Comments:     Note composed:7:54 AM 12/13/2024

## 2024-12-13 NOTE — TELEPHONE ENCOUNTER
No care due was identified.  Health [facility] Embedded Care Due Messages. Reference number: [id number].   12/13/2024 2:02:46 AM CST

## 2024-12-18 ENCOUNTER — OFFICE VISIT (OUTPATIENT)
Dept: CARDIOLOGY | Facility: CLINIC | Age: 79
End: 2024-12-18
Payer: MEDICARE

## 2024-12-18 VITALS
HEART RATE: 65 BPM | SYSTOLIC BLOOD PRESSURE: 120 MMHG | BODY MASS INDEX: 26.05 KG/M2 | WEIGHT: 186.06 LBS | DIASTOLIC BLOOD PRESSURE: 68 MMHG | HEIGHT: 71 IN

## 2024-12-18 DIAGNOSIS — R10.31 RIGHT INGUINAL PAIN: ICD-10-CM

## 2024-12-18 DIAGNOSIS — I65.23 BILATERAL CAROTID ARTERY STENOSIS: Primary | ICD-10-CM

## 2024-12-18 DIAGNOSIS — M25.551 RIGHT HIP PAIN: ICD-10-CM

## 2024-12-18 DIAGNOSIS — Z87.891 FORMER SMOKER: ICD-10-CM

## 2024-12-18 PROCEDURE — 1126F AMNT PAIN NOTED NONE PRSNT: CPT | Mod: HCNC,CPTII,S$GLB, | Performed by: INTERNAL MEDICINE

## 2024-12-18 PROCEDURE — 3074F SYST BP LT 130 MM HG: CPT | Mod: HCNC,CPTII,S$GLB, | Performed by: INTERNAL MEDICINE

## 2024-12-18 PROCEDURE — 3078F DIAST BP <80 MM HG: CPT | Mod: HCNC,CPTII,S$GLB, | Performed by: INTERNAL MEDICINE

## 2024-12-18 PROCEDURE — 1159F MED LIST DOCD IN RCRD: CPT | Mod: HCNC,CPTII,S$GLB, | Performed by: INTERNAL MEDICINE

## 2024-12-18 PROCEDURE — 3288F FALL RISK ASSESSMENT DOCD: CPT | Mod: HCNC,CPTII,S$GLB, | Performed by: INTERNAL MEDICINE

## 2024-12-18 PROCEDURE — 99999 PR PBB SHADOW E&M-EST. PATIENT-LVL IV: CPT | Mod: PBBFAC,HCNC,, | Performed by: INTERNAL MEDICINE

## 2024-12-18 PROCEDURE — 1101F PT FALLS ASSESS-DOCD LE1/YR: CPT | Mod: HCNC,CPTII,S$GLB, | Performed by: INTERNAL MEDICINE

## 2024-12-18 PROCEDURE — 99212 OFFICE O/P EST SF 10 MIN: CPT | Mod: HCNC,S$GLB,, | Performed by: INTERNAL MEDICINE

## 2024-12-18 NOTE — PROGRESS NOTES
[facility]    CC: Follow up     Patient ID: Kvng Redd is a 79 y.o. male with carotid artery disease, angio-edema, former smoker, who presents for a follow up appointment.  Pertinent history/events are as follows:     -Pt kindy referred by Dr. Garcia for Preoperative Cardiac Risk Stratification Prior to Planned Bilateral Carpal tunnel Release Surgery.     -At our initial clinic visit on 8/19/2020, Mr. Redd reports no chest pain, SOB, LE edema, palpitations, TIA symptom or syncope. Can perform greater than 4 METS with no difficulty.  Formerly smoked a pack a day for 20 years.  Quit in 1980.  He is scheduled for bilateral carpal tunnel release surgery on 9/2/20 with Dr. Mayen.  EKG from 8/17/2020 shows normal sinus rhythm with nonspecific ST/T wave changes.    Plan:   Preoperative Cardiac Risk Stratification Prior to Planned Bilateral Carpal tunnel Release Surgery- Mr. Redd has no chest pain, no heart failure symptoms, and no documented arrhythmia(s).  He can perform greater than 4 METS with no difficulty.  EKG from 8/17/2020 shows normal sinus rhythm with nonspecific ST/T wave changes.  Exercise stress echo from 10/15/2018 showed normal LV systolic function with EF of 60-65%; indeterminate LV diastolic function; normal RV systolic function; estimated PA systolic pressure is 21 mmHg; no evidence of stress induced myocardial ischemia.  He is at low to moderate risk for a perioperative major adverse cardiac event during this moderate risk procedure.  Revised Cardiac Risk Index of 3.9%.  No further testing indicated.  OK to proceed with surgery.    Abnormal EKG- Pt is asymptomatic.  Exercise stress echo from 10/15/2018 showed normal LV systolic function with EF of 60-65%; indeterminate LV diastolic function; normal RV systolic function; estimated PA systolic pressure is 21 mmHg; no evidence of stress induced myocardial ischemia.   HLD- Ten year ASCVD risk score elevated at 22%.  Start ASA 81  mg daily and atorvastatin 40 mg daily.      -Mr. Redd underwent left carpal tunnel release surgery on 9/2/2020 with no complications.      -At follow up clinic visit on 11/23/2020, Mr. Redd reported doing well with no chest pain, SOB, LE edema, TIA symptoms or syncope.  Labs from 11/16/2020 shows total LDL of 55 vs 109 on 10/7/2019.    Plan:   Preoperative Cardiac Risk Stratification Prior to Planned Bilateral Carpal tunnel Release Surgery- Mr. Redd has no chest pain, no heart failure symptoms, and no documented arrhythmia(s).  He can perform greater than 4 METS with no difficulty.  EKG from 8/17/2020 shows normal sinus rhythm with nonspecific ST/T wave changes.  Exercise stress echo from 10/15/2018 showed normal LV systolic function with EF of 60-65%; indeterminate LV diastolic function; normal RV systolic function; estimated PA systolic pressure is 21 mmHg; no evidence of stress induced myocardial ischemia.  He is at low to moderate risk for a perioperative major adverse cardiac event during this moderate risk procedure.  Revised Cardiac Risk Index of 3.9%.  No further testing indicated.  OK to proceed with surgery.    Abnormal EKG- Pt is asymptomatic.  Exercise stress echo from 10/15/2018 showed normal LV systolic function with EF of 60-65%; indeterminate LV diastolic function; normal RV systolic function; estimated PA systolic pressure is 21 mmHg; no evidence of stress induced myocardial ischemia.   HLD- Ten year ASCVD risk score elevated at 22%.   Labs from 11/16/2020 shows total LDL of 55 vs 109 on 10/7/2019.  LFT's within normal limits.  Continue ASA 81 mg daily and atorvastatin 40 mg daily.      -At clinic visit on 6/2/2021, Mr. Redd reported doing well with no chest pain, SOB, LE edema, TIA symptoms or syncope.  Labs from 5/31/2021 show LDL 56 vs 55 on 11/16/2020.  Carotid Ultrasound on 5/31/2021 revealed 20-39% right Internal Carotid Stenosis; 0-19% left Internal Carotid Stenosis.   Plan:    Preoperative Cardiac Risk Stratification Prior to Planned Bilateral Carpal tunnel Release Surgery- Mr. Redd has no chest pain, no heart failure symptoms, and no documented arrhythmia(s).  He can perform greater than 4 METS with no difficulty.  EKG from 8/17/2020 shows normal sinus rhythm with nonspecific ST/T wave changes.  Exercise stress echo from 10/15/2018 showed normal LV systolic function with EF of 60-65%; indeterminate LV diastolic function; normal RV systolic function; estimated PA systolic pressure is 21 mmHg; no evidence of stress induced myocardial ischemia.  He is at low to moderate risk for a perioperative major adverse cardiac event during this moderate risk procedure.  Revised Cardiac Risk Index of 3.9%.  No further testing indicated.  OK to proceed with surgery.    Abnormal EKG- Pt is asymptomatic.  Exercise stress echo from 10/15/2018 showed normal LV systolic function with EF of 60-65%; indeterminate LV diastolic function; normal RV systolic function; estimated PA systolic pressure is 21 mmHg; no evidence of stress induced myocardial ischemia.   HLD- Ten year ASCVD risk score elevated at 22%.  Labs from 5/31/2021 show LDL 56 vs 55 on 11/16/2020.  LFT's within normal limits.  Continue ASA 81 mg daily and atorvastatin 40 mg daily.    Carotid Artery Disease- Carotid Ultrasound on 5/31/2021 revealed 20-39% right Internal Carotid Stenosis; 0-19% left Internal Carotid Stenosis.   Continue ASA 81 mg daily and atorvastatin 40 mg daily.    -At clinic visit on 9/7/2022, Mr. Redd reported doing well with no chest pain, SOB, LE edema, TIA symptoms or syncope.   Plan:   HLD- LDL 59 on 2/1/2022.  Continue ASA 81 mg daily and atorvastatin 40 mg daily.    Carotid Artery Disease- Carotid Ultrasound on 5/31/2021 revealed 20-39% right Internal Carotid Stenosis; 0-19% left Internal Carotid Stenosis.   Continue ASA 81 mg daily and atorvastatin 40 mg daily.    -At clinic visit on 10/11/2023, Mr. Redd reports no  new issues.  He  has no chest pain, SOB, LE edema, TIA symptoms or syncope.  LDL 57 on 2/8/2023.    Plan:  HLD- LDL 57 on 2/8/2023.  Continue ASA 81 mg daily and atorvastatin 40 mg daily.    Carotid Artery Disease- Carotid Ultrasound on 5/31/2021 revealed 20-39% right Internal Carotid Stenosis; 0-19% left Internal Carotid Stenosis.   Continue ASA 81 mg daily and atorvastatin 40 mg daily.  Check updated carotid ultrasound.     HPI:  Mr. Redd reports pain at right groin vs right hip which started 1 week ago while walking at a retreat. He has no chest pain or SOB. Carotid Ultrasound on 11/13/2023 revealed less than 50% bilateral Internal Carotid Stenosis. There is antegrade flow in the vertebral arteries bilaterally. LDL 61 on 2/19/2024.       Past Medical History:   Diagnosis Date    Allergy     Angio-edema     BPH (benign prostatic hyperplasia)     Chronic idiopathic urticaria 5/8/2017     Past Surgical History:   Procedure Laterality Date    ADENOIDECTOMY      CARPAL TUNNEL RELEASE Bilateral     HERNIA REPAIR      right shoulder replacement      SINUS SURGERY      rhinoplasty    SPINE SURGERY      Four lumbar spine surgeries    TONSILLECTOMY       Social History     Socioeconomic History    Marital status:    Tobacco Use    Smoking status: Former     Current packs/day: 1.00     Average packs/day: 1 pack/day for 34.0 years (34.0 ttl pk-yrs)     Types: Cigarettes     Passive exposure: Past    Smokeless tobacco: Former     Quit date: 1/1/1980   Substance and Sexual Activity    Alcohol use: No    Drug use: No    Sexual activity: Yes     Partners: Female     Social Drivers of Health     Financial Resource Strain: Low Risk  (12/18/2024)    Overall Financial Resource Strain (CARDIA)     Difficulty of Paying Living Expenses: Not hard at all   Food Insecurity: No Food Insecurity (12/18/2024)    Hunger Vital Sign     Worried About Running Out of Food in the Last Year: Never true     Ran Out of Food in the Last Year:  Never true   Transportation Needs: No Transportation Needs (2/8/2023)    PRAPARE - Transportation     Lack of Transportation (Medical): No     Lack of Transportation (Non-Medical): No   Physical Activity: Insufficiently Active (12/18/2024)    Exercise Vital Sign     Days of Exercise per Week: 3 days     Minutes of Exercise per Session: 30 min   Stress: No Stress Concern Present (12/18/2024)    [employer] - Occupational Stress Questionnaire     Feeling of Stress : Not at all   Housing Stability: Low Risk  (2/8/2023)    Housing Stability Vital Sign     Unable to Pay for Housing in the Last Year: No     Number of Places Lived in the Last Year: 1     Unstable Housing in the Last Year: No     Family History   Problem Relation Name Age of Onset    Suicide Sister      Lung cancer Father      Cancer Father      Cancer Mother      Tuberculosis Mother         Review of patient's allergies indicates:   Allergen Reactions    Diagnostic aids, otherwise unspecified      Angioedema       Medication List with Changes/Refills   Current Medications    ASPIRIN (ECOTRIN) 81 MG EC TABLET    Take 81 mg by mouth once daily.    ATORVASTATIN (LIPITOR) 40 MG TABLET    TAKE 1 TABLET EVERY DAY    CETIRIZINE (ZYRTEC) 10 MG TABLET    Take 1 tablet (10 mg total) by mouth every evening.    CYANOCOBALAMIN (VITAMIN B-12) 1000 MCG TABLET    Take 1,000 mcg by mouth once daily.     DOXAZOSIN (CARDURA) 4 MG TABLET    Take 4 mg by mouth once daily.     GLUCOSAMINE-CHONDROITIN 500-400 MG TABLET    Take 1 tablet by mouth once daily.    IBUPROFEN (ADVIL,MOTRIN) 200 MG TABLET    Take 800 mg by mouth every 6 (six) hours as needed for Pain.    MELATONIN 5 MG TBDL    Take 2 tablets by mouth every evening.    MONTELUKAST (SINGULAIR) 10 MG TABLET    TAKE 1 TABLET EVERY EVENING    MULTIVITAMIN ORAL    Take 1 tablet by mouth once daily at 6am.    OMEPRAZOLE (PRILOSEC) 20 MG CAPSULE    TAKE 1 CAPSULE ONE TIME DAILY    TURMERIC, BULK,  MISC    1,000 mg by Misc.(Non-Drug; Combo Route) route once daily.       Review of Systems  Constitution: Denies chills, fever, and sweats.  HENT: Denies headaches or blurry vision.  Cardiovascular: Denies chest pain or irregular heart beat.  Respiratory: Denies cough or shortness of breath.  Gastrointestinal: Denies abdominal pain, nausea, or vomiting.  Musculoskeletal: Denies muscle cramps.  Neurological: Denies dizziness or focal weakness.  Psychiatric/Behavioral: Normal mental status.  Hematologic/Lymphatic: Denies bleeding problem or easy bruising/bleeding.  Skin: Denies rash or suspicious lesions    Physical Examination  There were no vitals taken for this visit.    Constitutional: No acute distress, conversant  HEENT: Sclera anicteric, Pupils equal, round and reactive to light, extraocular motions intact, Oropharynx clear  Neck: No JVD, no carotid bruits  Cardiovascular: regular rate and rhythm, no murmur, rubs or gallops, normal S1/S2  Pulmonary: Clear to auscultation bilaterally  Abdominal: Abdomen soft, nontender, nondistended, positive bowel sounds  Extremities: No lower extremity edema,   Pulses:  Carotid pulses are 2+ on the right side, and 2+ on the left side.  Radial pulses are 2+ on the right side, and 2+ on the left side.   Femoral pulses are 2+ on the right side, and 2+ on the left side.  Popliteal pulses are 2+ on the right side, and 2+ on the left side.   Dorsalis pedis pulses are 2+ on the right side, and 2+ on the left side.   Posterior tibial pulses are 2+ on the right side, and 2+ on the left side.    Skin: No ecchymosis, erythema, or ulcers  Psych: Alert and oriented x 3, appropriate affect  Neuro: CNII-XII intact, no focal deficits    Labs:  Most Recent Data  CBC:   Lab Results   Component Value Date    WBC 5.58 02/19/2024    HGB 12.7 (L) 02/19/2024    HCT 38.7 (L) 02/19/2024     02/19/2024    MCV 91 02/19/2024    RDW 13.2 02/19/2024     BMP:   Lab Results   Component Value Date     " 02/19/2024    K 4.7 02/19/2024     02/19/2024    CO2 24 02/19/2024    BUN 16 02/19/2024    CREATININE 0.9 02/19/2024    GLU 98 02/19/2024    CALCIUM 9.5 02/19/2024     LFTS;   Lab Results   Component Value Date    PROT 6.7 02/19/2024    ALBUMIN 3.8 02/19/2024    BILITOT 0.5 02/19/2024    AST 42 (H) 02/19/2024    ALKPHOS 84 02/19/2024    ALT 35 02/19/2024     COAGS: No results found for: "INR", "PROTIME", "PTT"  FLP:   Lab Results   Component Value Date    CHOL 120 02/19/2024    HDL 50 02/19/2024    LDLCALC 61.0 (L) 02/19/2024    TRIG 45 02/19/2024    CHOLHDL 41.7 02/19/2024     CARDIAC: No results found for: "TROPONINI", "CKTOTAL", "CKMB", "BNP"    EKG 8/17/2020:  Normal sinus rhythm  Nonspecific ST/T wave changes    Carotid Ultrasound 11/13/2023:    There is less than 50% right Internal Carotid Stenosis.    There is less than 50% left Internal Carotid Stenosis.    There is antegrade flow in the vertebral arteries bilaterally.    Carotid Ultrasound 5/31/2021:  There is 20-39% right Internal Carotid Stenosis.  There is 0-19% left Internal Carotid Stenosis.    Exercise Stress Echo 10/15/2018:  CONCLUSIONS     1 - Normal left ventricular systolic function (EF 60-65%).     2 - No wall motion abnormalities.     3 - Indeterminate LV diastolic function.     4 - Normal right ventricular systolic function .     5 - The estimated PA systolic pressure is 21 mmHg.   No evidence of stress induced myocardial ischemia.     Assessment/Plan:  Kvng Redd is a 79 y.o. male with carotid artery disease, angio-edema, former smoker, who presents for a follow up appointment.     1.  Right hip/groin pain- Unclear if pain is coming from right inguinal area or hip. Refer to General Surgery and Ortho for evaluation.     2. HLD- LDL 61 on 2/19/2024.  Continue ASA 81 mg daily and atorvastatin 40 mg daily.      2. Carotid Artery Disease- Carotid Ultrasound on 11/13/2023 revealed less than 50% bilateral Internal Carotid " Stenosis. There is antegrade flow in the vertebral arteries bilaterally.  Continue ASA 81 mg daily and atorvastatin 40 mg daily.  Check updated carotid ultrasound.     Will call pt with results of carotid ultrasound  Otherwise, follow up in 6 months     Total duration of face to face visit time 30 minutes.  Total time spent counseling greater than fifty percent of total visit time.  Counseling included discussion regarding imaging findings, diagnosis, possibilities, treatment options, risks and benefits.  The patient had many questions regarding the options and long-term effects.    Hemanth Gerardo MD, PhD  Interventional Cardiology

## 2024-12-18 NOTE — PATIENT INSTRUCTIONS
Assessment/Plan:  Kvng Redd is a 79 y.o. male with carotid artery disease, angio-edema, former smoker, who presents for a follow up appointment.     1.  Right hip/groin pain- Unclear if pain is coming from right inguinal area or hip. Refer to General Surgery and Ortho for evaluation.     2. HLD- LDL 61 on 2/19/2024.  Continue ASA 81 mg daily and atorvastatin 40 mg daily.      2. Carotid Artery Disease- Carotid Ultrasound on 11/13/2023 revealed less than 50% bilateral Internal Carotid Stenosis. There is antegrade flow in the vertebral arteries bilaterally.  Continue ASA 81 mg daily and atorvastatin 40 mg daily.  Check updated carotid ultrasound.     Will call pt with results of carotid ultrasound  Otherwise, follow up 6 months

## 2025-02-03 ENCOUNTER — HOSPITAL ENCOUNTER (OUTPATIENT)
Dept: CARDIOLOGY | Facility: HOSPITAL | Age: 80
Discharge: HOME | End: 2025-02-03
Attending: INTERNAL MEDICINE
Payer: MEDICARE

## 2025-02-03 DIAGNOSIS — I65.23 BILATERAL CAROTID ARTERY STENOSIS: ICD-10-CM

## 2025-02-03 LAB
LEFT ARM DIASTOLIC BLOOD PRESSURE: 60 MMHG
LEFT ARM SYSTOLIC BLOOD PRESSURE: 115 MMHG
LEFT CBA DIAS: 18 CM/S
LEFT CBA SYS: 103 CM/S
LEFT CCA DIST DIAS: 19 CM/S
LEFT CCA DIST SYS: 142 CM/S
LEFT CCA MID DIAS: 12 CM/S
LEFT CCA MID SYS: 121 CM/S
LEFT CCA PROX DIAS: 13 CM/S
LEFT CCA PROX SYS: 129 CM/S
LEFT ECA DIAS: 11 CM/S
LEFT ECA SYS: 117 CM/S
LEFT ICA DIST DIAS: 25 CM/S
LEFT ICA DIST SYS: 97 CM/S
LEFT ICA MID DIAS: 18 CM/S
LEFT ICA MID SYS: 99 CM/S
LEFT ICA PROX DIAS: 12 CM/S
LEFT ICA PROX SYS: 68 CM/S
LEFT VERTEBRAL DIAS: 8 CM/S
LEFT VERTEBRAL SYS: 45 CM/S
OHS CV CAROTID RIGHT ICA EDV HIGHEST: 55
OHS CV CAROTID ULTRASOUND LEFT ICA/CCA RATIO: 0.7
OHS CV CAROTID ULTRASOUND RIGHT ICA/CCA RATIO: 2.25
OHS CV PV CAROTID LEFT HIGHEST CCA: 142
OHS CV PV CAROTID LEFT HIGHEST ICA: 99
OHS CV PV CAROTID RIGHT HIGHEST CCA: 99
OHS CV PV CAROTID RIGHT HIGHEST ICA: 171
OHS CV US CAROTID LEFT HIGHEST EDV: 25
RIGHT ARM DIASTOLIC BLOOD PRESSURE: 60 MMHG
RIGHT ARM SYSTOLIC BLOOD PRESSURE: 120 MMHG
RIGHT CBA DIAS: 15 CM/S
RIGHT CBA SYS: 150 CM/S
RIGHT CCA DIST DIAS: 12 CM/S
RIGHT CCA DIST SYS: 76 CM/S
RIGHT CCA MID DIAS: 9 CM/S
RIGHT CCA MID SYS: 96 CM/S
RIGHT CCA PROX DIAS: 17 CM/S
RIGHT CCA PROX SYS: 99 CM/S
RIGHT ECA DIAS: 11 CM/S
RIGHT ECA SYS: 190 CM/S
RIGHT ICA DIST DIAS: 55 CM/S
RIGHT ICA DIST SYS: 125 CM/S
RIGHT ICA MID DIAS: 29 CM/S
RIGHT ICA MID SYS: 170 CM/S
RIGHT ICA PROX DIAS: 25 CM/S
RIGHT ICA PROX SYS: 171 CM/S
RIGHT VERTEBRAL DIAS: 19 CM/S
RIGHT VERTEBRAL SYS: 67 CM/S

## 2025-02-03 PROCEDURE — 93880 EXTRACRANIAL BILAT STUDY: CPT | Mod: PO

## 2025-02-03 PROCEDURE — 93880 EXTRACRANIAL BILAT STUDY: CPT | Mod: 26,,, | Performed by: INTERNAL MEDICINE

## 2025-02-21 DIAGNOSIS — Z00.00 ENCOUNTER FOR MEDICARE ANNUAL WELLNESS EXAM: ICD-10-CM

## 2025-03-07 ENCOUNTER — TELEPHONE (OUTPATIENT)
Dept: INTERNAL MEDICINE | Facility: CLINIC | Age: 80
End: 2025-03-07
Payer: MEDICARE

## 2025-03-07 ENCOUNTER — OFFICE VISIT (OUTPATIENT)
Dept: URGENT CARE | Facility: CLINIC | Age: 80
End: 2025-03-07
Payer: MEDICARE

## 2025-03-07 VITALS
RESPIRATION RATE: 19 BRPM | WEIGHT: 186 LBS | TEMPERATURE: 99 F | BODY MASS INDEX: 25.94 KG/M2 | SYSTOLIC BLOOD PRESSURE: 127 MMHG | HEART RATE: 78 BPM | OXYGEN SATURATION: 97 % | DIASTOLIC BLOOD PRESSURE: 69 MMHG

## 2025-03-07 DIAGNOSIS — R79.9 ABNORMAL FINDING OF BLOOD CHEMISTRY, UNSPECIFIED: ICD-10-CM

## 2025-03-07 DIAGNOSIS — E78.2 MIXED HYPERLIPIDEMIA: ICD-10-CM

## 2025-03-07 DIAGNOSIS — J06.9 VIRAL URI WITH COUGH: Primary | ICD-10-CM

## 2025-03-07 DIAGNOSIS — Z12.5 PROSTATE CANCER SCREENING: ICD-10-CM

## 2025-03-07 DIAGNOSIS — K21.9 GASTROESOPHAGEAL REFLUX DISEASE, UNSPECIFIED WHETHER ESOPHAGITIS PRESENT: ICD-10-CM

## 2025-03-07 DIAGNOSIS — Z00.00 ANNUAL PHYSICAL EXAM: Primary | ICD-10-CM

## 2025-03-07 DIAGNOSIS — R09.81 CONGESTION OF NASAL SINUS: ICD-10-CM

## 2025-03-07 LAB
CTP QC/QA: YES
SARS CORONAVIRUS 2 ANTIGEN: NEGATIVE

## 2025-03-07 RX ORDER — BENZONATATE 200 MG/1
200 CAPSULE ORAL 3 TIMES DAILY PRN
Qty: 30 CAPSULE | Refills: 0 | Status: SHIPPED | OUTPATIENT
Start: 2025-03-07 | End: 2025-03-17

## 2025-03-07 NOTE — TELEPHONE ENCOUNTER
----- Message from Austyn sent at 3/7/2025  9:36 AM CST -----  Regarding: Annual Bloodwork Orders  Contact: Pt [phone] [email or web address] Patient is calling for Medical Advice regarding: Patient needing annual bloodwork orders for upcoming appointment. Please call patient at number provided to notify patient once orders are placed.

## 2025-03-07 NOTE — PATIENT INSTRUCTIONS
- Rest.    - Drink plenty of fluids. Increasing your fluid intake will help loosen up mucous.  - Viral upper respiratory infections typically run their course in 10-14 days.      CONGESTION:  - You can take over-the-counter claritin, zyrtec, allegra, OR xyzal as directed. These are antihistamines that can help with runny nose, nasal congestion, sneezing, and helps to dry up post-nasal drip, which usually causes sore throat and cough.   - You can use Flonase (fluticasone) nasal spray as directed for sinus congestion and postnasal drip. This is a steroid nasal spray that works locally over time to decrease the inflammation in your nose/sinuses and help with allergic symptoms. This is not an quick- relief spray like afrin, but it works well if used daily.  Discontinue if you develop nose bleed  - Use nasal saline prior to Flonase.  - Use Ocean Spray Nasal Saline 1-3 puffs each nostril every 2-3 hours then blow out onto tissue. This is to irrigate the nasal passage way to clear the sinus openings. Use until sinus problem resolved.  - You can also try NasalCrom nasal spray, which has been shown to help reduce duration of symptoms when started within 24 hours of symptom onset. Okay to use with Flonase and other allergy medications.   - A Neti Pot with sterile saline can help break up nasal congestion and give relief.     COUGH:  - You can take Delsym to help with cough.     SORE THROAT:   - Chloraseptic throat spray can help numb the throat.   - Warm salt water gargles can help with sore throat.  - Warm tea with honey can help with sore throat and cough. Honey is a natural cough suppressant.     - Rest.    - Drink plenty of fluids.    - Acetaminophen (tylenol) or Ibuprofen (advil,motrin) as directed as needed for fever/pain. Avoid tylenol if you have a history of liver disease. Do not take ibuprofen if you have a history of GI bleeding, kidney disease, or if you take blood thinners.   - Ibuprofen dosing for adults: 400 mg  by mouth every 4-6 hours as needed. Max: 2400 mg/day; Info: use lowest effective dose, shortest effective treatment duration; give w/ food if GI upset occurs.  - Tylenol dosing for adults: [By mouth route, immediate-release form] Dose: 325-1000 mg by mouth every 4-6h as needed; Max: 1 g/4h and 4 g/day from all sources. [By mouth route, extended-release form] Dose: 650-1300 mg Extended Release by mouth every 8h as needed; Max: 4 g/day from all sources.     - You must understand that you have received an Urgent Care treatment only and that you may be released before all of your medical problems are known or treated.   - You, the patient, will arrange for follow up care as instructed.   - If your condition worsens or fails to improve we recommend that you receive another evaluation at the ER immediately or contact your PCP to discuss your concerns or return here.   - Follow up with your PCP or specialty clinic as directed in the next 1-2 weeks if not improved or as needed.  You can call [phone] to schedule an appointment with the appropriate provider.    If your symptoms do not improve or worsen, go to the emergency room immediately.

## 2025-03-07 NOTE — PROGRESS NOTES
Subjective:      Patient ID: Kvng Redd is a 79 y.o. male.    Vitals:  weight is 84.4 kg (186 lb). His oral temperature is 98.6 °F (37 °C). His blood pressure is 127/69 and his pulse is 78. His respiration is 19 and oxygen saturation is 97%.     Chief Complaint: Cough    This is a 79 y.o. male who presents today with a chief complaint of  cough, body aches, post nasal drip    Cough  This is a new problem. The current episode started in the past 7 days. The problem has been waxing and waning. The problem occurs constantly. The cough is Non-productive. Associated symptoms include nasal congestion and postnasal drip. Pertinent negatives include no chest pain, chills, ear congestion, ear pain, fever, headaches, heartburn, hemoptysis, myalgias, rash, rhinorrhea, sore throat, shortness of breath, sweats, weight loss or wheezing. Nothing aggravates the symptoms. He has tried nothing for the symptoms. The treatment provided no relief. There is no history of asthma, bronchiectasis, bronchitis, COPD, emphysema, environmental allergies or pneumonia.       Constitution: Negative for chills and fever.   HENT:  Positive for postnasal drip. Negative for ear pain and sore throat.    Cardiovascular:  Negative for chest pain.   Respiratory:  Positive for cough. Negative for bloody sputum, shortness of breath and wheezing.    Gastrointestinal:  Negative for heartburn.   Musculoskeletal:  Negative for muscle ache.   Skin:  Negative for rash.   Allergic/Immunologic: Negative for environmental allergies.   Neurological:  Negative for headaches.      Objective:     Physical Exam   Constitutional: He is oriented to person, place, and time. He appears well-developed. He is cooperative.  Non-toxic appearance. He does not appear ill. No distress.      Comments:Patient sits comfortably in exam chair. Answers questions in complete sentences. Does not show any signs of distress or discoloration.        HENT:   Head: Normocephalic and  atraumatic.   Ears:   Right Ear: Hearing, tympanic membrane, external ear and ear canal normal. no impacted cerumen  Left Ear: Hearing, tympanic membrane, external ear and ear canal normal. no impacted cerumen  Nose: Rhinorrhea and congestion present. No mucosal edema or nasal deformity. No epistaxis. Right sinus exhibits no maxillary sinus tenderness and no frontal sinus tenderness. Left sinus exhibits no maxillary sinus tenderness and no frontal sinus tenderness.   Mouth/Throat: Uvula is midline, oropharynx is clear and moist and mucous membranes are normal. No trismus in the jaw. Normal dentition. No uvula swelling. No oropharyngeal exudate, posterior oropharyngeal edema or posterior oropharyngeal erythema.   Eyes: Conjunctivae and lids are normal. No scleral icterus.   Neck: Trachea normal and phonation normal. Neck supple. No edema present. No erythema present. No neck rigidity present.   Cardiovascular: Normal rate, regular rhythm, normal heart sounds and normal pulses.   No murmur heard.Exam reveals no gallop and no friction rub.   Pulmonary/Chest: Effort normal and breath sounds normal. No stridor. No respiratory distress. He has no decreased breath sounds. He has no wheezes. He has no rhonchi. He has no rales. He exhibits no tenderness.   Abdominal: Normal appearance.   Musculoskeletal: Normal range of motion.         General: No deformity. Normal range of motion.   Neurological: He is alert and oriented to person, place, and time. He exhibits normal muscle tone. Coordination normal.   Skin: Skin is warm, dry, intact, not diaphoretic and not pale.   Psychiatric: His speech is normal and behavior is normal. Judgment and thought content normal.   Nursing note and vitals reviewed.    Results for orders placed or performed in visit on 03/07/25   SARS Coronavirus 2 Antigen, POCT Manual Read    Collection Time: 03/07/25 10:37 AM   Result Value Ref Range    SARS Coronavirus 2 Antigen Negative Negative, Presumptive  Negative     Acceptable Yes        Assessment:     1. Viral URI with cough    2. Congestion of nasal sinus        Plan:       Viral URI with cough  -     benzonatate (TESSALON) 200 MG capsule; Take 1 capsule (200 mg total) by mouth 3 (three) times daily as needed for Cough.  Dispense: 30 capsule; Refill: 0    Congestion of nasal sinus  -     SARS Coronavirus 2 Antigen, POCT Manual Read                  Patient Instructions   - Rest.    - Drink plenty of fluids. Increasing your fluid intake will help loosen up mucous.  - Viral upper respiratory infections typically run their course in 10-14 days.      CONGESTION:  - You can take over-the-counter claritin, zyrtec, allegra, OR xyzal as directed. These are antihistamines that can help with runny nose, nasal congestion, sneezing, and helps to dry up post-nasal drip, which usually causes sore throat and cough.   - You can use Flonase (fluticasone) nasal spray as directed for sinus congestion and postnasal drip. This is a steroid nasal spray that works locally over time to decrease the inflammation in your nose/sinuses and help with allergic symptoms. This is not an quick- relief spray like afrin, but it works well if used daily.  Discontinue if you develop nose bleed  - Use nasal saline prior to Flonase.  - Use Ocean Spray Nasal Saline 1-3 puffs each nostril every 2-3 hours then blow out onto tissue. This is to irrigate the nasal passage way to clear the sinus openings. Use until sinus problem resolved.  - You can also try NasalCrom nasal spray, which has been shown to help reduce duration of symptoms when started within 24 hours of symptom onset. Okay to use with Flonase and other allergy medications.   - A Neti Pot with sterile saline can help break up nasal congestion and give relief.     COUGH:  - You can take Delsym to help with cough.     SORE THROAT:   - Chloraseptic throat spray can help numb the throat.   - Warm salt water gargles can help with  sore throat.  - Warm tea with honey can help with sore throat and cough. Honey is a natural cough suppressant.     - Rest.    - Drink plenty of fluids.    - Acetaminophen (tylenol) or Ibuprofen (advil,motrin) as directed as needed for fever/pain. Avoid tylenol if you have a history of liver disease. Do not take ibuprofen if you have a history of GI bleeding, kidney disease, or if you take blood thinners.   - Ibuprofen dosing for adults: 400 mg by mouth every 4-6 hours as needed. Max: 2400 mg/day; Info: use lowest effective dose, shortest effective treatment duration; give w/ food if GI upset occurs.  - Tylenol dosing for adults: [By mouth route, immediate-release form] Dose: 325-1000 mg by mouth every 4-6h as needed; Max: 1 g/4h and 4 g/day from all sources. [By mouth route, extended-release form] Dose: 650-1300 mg Extended Release by mouth every 8h as needed; Max: 4 g/day from all sources.     - You must understand that you have received an Urgent Care treatment only and that you may be released before all of your medical problems are known or treated.   - You, the patient, will arrange for follow up care as instructed.   - If your condition worsens or fails to improve we recommend that you receive another evaluation at the ER immediately or contact your PCP to discuss your concerns or return here.   - Follow up with your PCP or specialty clinic as directed in the next 1-2 weeks if not improved or as needed.  You can call [phone] to schedule an appointment with the appropriate provider.    If your symptoms do not improve or worsen, go to the emergency room immediately.

## 2025-03-10 ENCOUNTER — LAB VISIT (OUTPATIENT)
Dept: LAB | Facility: HOSPITAL | Age: 80
End: 2025-03-10
Attending: INTERNAL MEDICINE
Payer: MEDICARE

## 2025-03-10 DIAGNOSIS — Z12.5 PROSTATE CANCER SCREENING: ICD-10-CM

## 2025-03-10 DIAGNOSIS — E78.2 MIXED HYPERLIPIDEMIA: ICD-10-CM

## 2025-03-10 DIAGNOSIS — R79.9 ABNORMAL FINDING OF BLOOD CHEMISTRY, UNSPECIFIED: ICD-10-CM

## 2025-03-10 DIAGNOSIS — K21.9 GASTROESOPHAGEAL REFLUX DISEASE, UNSPECIFIED WHETHER ESOPHAGITIS PRESENT: ICD-10-CM

## 2025-03-10 DIAGNOSIS — Z00.00 ANNUAL PHYSICAL EXAM: ICD-10-CM

## 2025-03-10 LAB
ALBUMIN SERPL BCP-MCNC: 3.8 G/DL (ref 3.5–5.2)
ALP SERPL-CCNC: 86 U/L (ref 40–150)
ALT SERPL W/O P-5'-P-CCNC: 31 U/L (ref 10–44)
ANION GAP SERPL CALC-SCNC: 6 MMOL/L (ref 8–16)
AST SERPL-CCNC: 39 U/L (ref 10–40)
BASOPHILS # BLD AUTO: 0.03 K/UL (ref 0–0.2)
BASOPHILS NFR BLD: 0.6 % (ref 0–1.9)
BILIRUB SERPL-MCNC: 0.4 MG/DL (ref 0.1–1)
BUN SERPL-MCNC: 22 MG/DL (ref 8–23)
CALCIUM SERPL-MCNC: 9.1 MG/DL (ref 8.7–10.5)
CHLORIDE SERPL-SCNC: 105 MMOL/L (ref 95–110)
CHOLEST SERPL-MCNC: 115 MG/DL (ref 120–199)
CHOLEST/HDLC SERPL: 2.4 {RATIO} (ref 2–5)
CO2 SERPL-SCNC: 25 MMOL/L (ref 23–29)
COMPLEXED PSA SERPL-MCNC: 2.6 NG/ML (ref 0–4)
CREAT SERPL-MCNC: 0.9 MG/DL (ref 0.5–1.4)
DIFFERENTIAL METHOD BLD: ABNORMAL
EOSINOPHIL # BLD AUTO: 0.2 K/UL (ref 0–0.5)
EOSINOPHIL NFR BLD: 4.2 % (ref 0–8)
ERYTHROCYTE [DISTWIDTH] IN BLOOD BY AUTOMATED COUNT: 13.2 % (ref 11.5–14.5)
EST. GFR  (NO RACE VARIABLE): >60 ML/MIN/1.73 M^2
ESTIMATED AVG GLUCOSE: 114 MG/DL (ref 68–131)
GLUCOSE SERPL-MCNC: 93 MG/DL (ref 70–110)
HBA1C MFR BLD: 5.6 % (ref 4–5.6)
HCT VFR BLD AUTO: 39.1 % (ref 40–54)
HDLC SERPL-MCNC: 47 MG/DL (ref 40–75)
HDLC SERPL: 40.9 % (ref 20–50)
HGB BLD-MCNC: 12.5 G/DL (ref 14–18)
IMM GRANULOCYTES # BLD AUTO: 0.01 K/UL (ref 0–0.04)
IMM GRANULOCYTES NFR BLD AUTO: 0.2 % (ref 0–0.5)
LDLC SERPL CALC-MCNC: 58 MG/DL (ref 63–159)
LYMPHOCYTES # BLD AUTO: 1.2 K/UL (ref 1–4.8)
LYMPHOCYTES NFR BLD: 24.5 % (ref 18–48)
MCH RBC QN AUTO: 29.6 PG (ref 27–31)
MCHC RBC AUTO-ENTMCNC: 32 G/DL (ref 32–36)
MCV RBC AUTO: 92 FL (ref 82–98)
MONOCYTES # BLD AUTO: 0.5 K/UL (ref 0.3–1)
MONOCYTES NFR BLD: 10 % (ref 4–15)
NEUTROPHILS # BLD AUTO: 3 K/UL (ref 1.8–7.7)
NEUTROPHILS NFR BLD: 60.5 % (ref 38–73)
NONHDLC SERPL-MCNC: 68 MG/DL
NRBC BLD-RTO: 0 /100 WBC
PLATELET # BLD AUTO: 176 K/UL (ref 150–450)
PMV BLD AUTO: 10.8 FL (ref 9.2–12.9)
POTASSIUM SERPL-SCNC: 4.8 MMOL/L (ref 3.5–5.1)
PROT SERPL-MCNC: 6.8 G/DL (ref 6–8.4)
RBC # BLD AUTO: 4.23 M/UL (ref 4.6–6.2)
SODIUM SERPL-SCNC: 136 MMOL/L (ref 136–145)
TRIGL SERPL-MCNC: 50 MG/DL (ref 30–150)
TSH SERPL DL<=0.005 MIU/L-ACNC: 2.46 UIU/ML (ref 0.4–4)
WBC # BLD AUTO: 5.02 K/UL (ref 3.9–12.7)

## 2025-03-10 PROCEDURE — 83036 HEMOGLOBIN GLYCOSYLATED A1C: CPT | Mod: HCNC | Performed by: INTERNAL MEDICINE

## 2025-03-10 PROCEDURE — 80053 COMPREHEN METABOLIC PANEL: CPT | Mod: HCNC | Performed by: INTERNAL MEDICINE

## 2025-03-10 PROCEDURE — 84153 ASSAY OF PSA TOTAL: CPT | Mod: HCNC | Performed by: INTERNAL MEDICINE

## 2025-03-10 PROCEDURE — 80061 LIPID PANEL: CPT | Mod: HCNC | Performed by: INTERNAL MEDICINE

## 2025-03-10 PROCEDURE — 36415 COLL VENOUS BLD VENIPUNCTURE: CPT | Mod: HCNC,PO | Performed by: INTERNAL MEDICINE

## 2025-03-10 PROCEDURE — 84443 ASSAY THYROID STIM HORMONE: CPT | Mod: HCNC | Performed by: INTERNAL MEDICINE

## 2025-03-10 PROCEDURE — 85025 COMPLETE CBC W/AUTO DIFF WBC: CPT | Mod: HCNC | Performed by: INTERNAL MEDICINE

## 2025-03-12 ENCOUNTER — RESULTS FOLLOW-UP (OUTPATIENT)
Dept: INTERNAL MEDICINE | Facility: CLINIC | Age: 80
End: 2025-03-12

## 2025-03-17 ENCOUNTER — OFFICE VISIT (OUTPATIENT)
Dept: INTERNAL MEDICINE | Facility: CLINIC | Age: 80
End: 2025-03-17
Payer: MEDICARE

## 2025-03-17 VITALS
HEART RATE: 75 BPM | BODY MASS INDEX: 25.9 KG/M2 | RESPIRATION RATE: 16 BRPM | TEMPERATURE: 98 F | SYSTOLIC BLOOD PRESSURE: 126 MMHG | OXYGEN SATURATION: 98 % | HEIGHT: 71 IN | DIASTOLIC BLOOD PRESSURE: 64 MMHG | WEIGHT: 185 LBS

## 2025-03-17 DIAGNOSIS — N40.0 BENIGN PROSTATIC HYPERPLASIA, UNSPECIFIED WHETHER LOWER URINARY TRACT SYMPTOMS PRESENT: ICD-10-CM

## 2025-03-17 DIAGNOSIS — H61.23 IMPACTED CERUMEN OF BOTH EARS: ICD-10-CM

## 2025-03-17 DIAGNOSIS — K21.9 GASTROESOPHAGEAL REFLUX DISEASE, UNSPECIFIED WHETHER ESOPHAGITIS PRESENT: ICD-10-CM

## 2025-03-17 DIAGNOSIS — E78.2 MIXED HYPERLIPIDEMIA: ICD-10-CM

## 2025-03-17 DIAGNOSIS — Z00.00 ANNUAL PHYSICAL EXAM: Primary | ICD-10-CM

## 2025-03-17 PROCEDURE — 1159F MED LIST DOCD IN RCRD: CPT | Mod: HCNC,CPTII,S$GLB, | Performed by: NURSE PRACTITIONER

## 2025-03-17 PROCEDURE — 3078F DIAST BP <80 MM HG: CPT | Mod: HCNC,CPTII,S$GLB, | Performed by: NURSE PRACTITIONER

## 2025-03-17 PROCEDURE — 99999 PR PBB SHADOW E&M-EST. PATIENT-LVL IV: CPT | Mod: PBBFAC,HCNC,, | Performed by: NURSE PRACTITIONER

## 2025-03-17 PROCEDURE — 3074F SYST BP LT 130 MM HG: CPT | Mod: HCNC,CPTII,S$GLB, | Performed by: NURSE PRACTITIONER

## 2025-03-17 PROCEDURE — 1160F RVW MEDS BY RX/DR IN RCRD: CPT | Mod: HCNC,CPTII,S$GLB, | Performed by: NURSE PRACTITIONER

## 2025-03-17 PROCEDURE — 99397 PER PM REEVAL EST PAT 65+ YR: CPT | Mod: HCNC,S$GLB,, | Performed by: NURSE PRACTITIONER

## 2025-03-17 RX ORDER — OMEPRAZOLE 20 MG/1
20 CAPSULE, DELAYED RELEASE ORAL DAILY
Qty: 90 CAPSULE | Refills: 3 | Status: SHIPPED | OUTPATIENT
Start: 2025-03-17

## 2025-05-19 ENCOUNTER — OFFICE VISIT (OUTPATIENT)
Dept: OTOLARYNGOLOGY | Facility: CLINIC | Age: 80
End: 2025-05-19
Payer: MEDICARE

## 2025-05-19 ENCOUNTER — CLINICAL SUPPORT (OUTPATIENT)
Dept: AUDIOLOGY | Facility: CLINIC | Age: 80
End: 2025-05-19
Payer: MEDICARE

## 2025-05-19 DIAGNOSIS — H90.3 SENSORINEURAL HEARING LOSS (SNHL) OF BOTH EARS: ICD-10-CM

## 2025-05-19 DIAGNOSIS — H93.13 TINNITUS, SUBJECTIVE, BILATERAL: ICD-10-CM

## 2025-05-19 DIAGNOSIS — H90.3 SENSORINEURAL HEARING LOSS (SNHL), BILATERAL: Primary | ICD-10-CM

## 2025-05-19 DIAGNOSIS — H61.23 IMPACTED CERUMEN OF BOTH EARS: Primary | ICD-10-CM

## 2025-05-19 PROCEDURE — 99999 PR PBB SHADOW E&M-EST. PATIENT-LVL III: CPT | Mod: PBBFAC,HCNC,, | Performed by: OTOLARYNGOLOGY

## 2025-05-19 PROCEDURE — 99203 OFFICE O/P NEW LOW 30 MIN: CPT | Mod: 25,HCNC,S$GLB, | Performed by: OTOLARYNGOLOGY

## 2025-05-19 PROCEDURE — 1126F AMNT PAIN NOTED NONE PRSNT: CPT | Mod: CPTII,HCNC,S$GLB, | Performed by: OTOLARYNGOLOGY

## 2025-05-19 PROCEDURE — 1159F MED LIST DOCD IN RCRD: CPT | Mod: CPTII,HCNC,S$GLB, | Performed by: OTOLARYNGOLOGY

## 2025-05-19 PROCEDURE — 92557 COMPREHENSIVE HEARING TEST: CPT | Mod: HCNC,S$GLB,,

## 2025-05-19 PROCEDURE — G0268 REMOVAL OF IMPACTED WAX MD: HCPCS | Mod: HCNC,S$GLB,, | Performed by: OTOLARYNGOLOGY

## 2025-05-19 PROCEDURE — 3288F FALL RISK ASSESSMENT DOCD: CPT | Mod: CPTII,HCNC,S$GLB, | Performed by: OTOLARYNGOLOGY

## 2025-05-19 PROCEDURE — 92567 TYMPANOMETRY: CPT | Mod: HCNC,S$GLB,,

## 2025-05-19 PROCEDURE — 1101F PT FALLS ASSESS-DOCD LE1/YR: CPT | Mod: CPTII,HCNC,S$GLB, | Performed by: OTOLARYNGOLOGY

## 2025-05-19 RX ORDER — TAMSULOSIN HYDROCHLORIDE 0.4 MG/1
0.4 CAPSULE ORAL
COMMUNITY
Start: 2025-04-08

## 2025-05-19 RX ORDER — FINASTERIDE 5 MG/1
5 TABLET, FILM COATED ORAL
COMMUNITY
Start: 2025-04-02

## 2025-05-19 NOTE — PROGRESS NOTES
5/19/2025    Audiologic Evaluation    Kvng Redd was seen today in the clinic for an audiologic evaluation.  Patient's main complaint was decreased hearing and tinnitus, bilaterally. Mr. Redd reported his tinnitus is relatively constant, but less noticeable when not in a quiet environment. He reported he is generally able to tune it out, and it does not negatively impact his sleep. Mr. Redd reported a history of occupational (i.e. working as a ) and recreational (i.e. cutting the grass at his house) noise exposure, without consistent use of hearing protection when in noise. Mr. Redd denied otalgia, aural fullness, and true vertigo.    Tympanometry revealed Type Ad in the right ear and Type Ad in the left ear.     Audiogram results revealed a mild to severe sensorineural hearing loss (SNHL), bilaterally.     Speech reception thresholds were noted at 30 dBHL in the right ear and 30 dBHL in the left ear.    Speech discrimination scores were 80% in the right ear and 80% in the left ear.    Today's results were discussed with the patient and hearing aids were recommended bilaterally. Mr. Redd is interested in a hearing aid consultation and would like to utilize any insurance benefits he has for hearing aids through [employer] (). At this time, our clinic is not able to utilize insurance benefits outside the [facility] for hearing aid coverage. I recommended that Mr. Redd contact his insurance provider to assess his benefits and request a list of providers who accept coverage for hearing aids through his plan. We discussed the advantages and disadvantages of amplification. I counseled Mr. Redd on tinnitus management techniques (i.e. limiting caffeine intake, getting enough sleep, utilizing white noise/music to mask the tinnitus, and reducing stress/negative emotions associated with tinnitus). I encouraged him to check the [employer] website for other management  strategies. We also discussed how in some cases people with hearing loss when they begin to wear hearing aids have reported improvement in their tinnitus. Ivania expressed understanding of today's results and the plan.    Recommendations:  Otologic evaluation  Hearing aid consultation  Annual audiogram or sooner if change perceived  Hearing protection in noise

## 2025-05-19 NOTE — PROGRESS NOTES
Ear, Nose, & Throat  Otolaryngology - Head & Neck Surgery    Summary of Visit:  Kvng Redd was referred to me by Azul Briggs in consultation for Cerumen Impaction      Subjective:     Chief Complaint:   Chief Complaint   Patient presents with    Cerumen Impaction       Kvng Redd is a 79 y.o. male who was referred to me by Azul Briggs in consultation for ear evaluation. He was recently seen by his PCP, bilateral cerumen impactions were noted on exam. He reports gradual decrease in hearing bilaterally over many years with associated non-bothersome tinnitus. He denies otalgia, otorrhea, vertigo, aural fullness/pressure. There is not a family history of hearing loss at a young age. There is not a prior history of ear surgery. There is not a prior history of ear infections. There is not a history of ear trauma. He reports a history of significant noise exposure.  He does not wear hearing aids.       Past Medical History  Active Ambulatory Problems     Diagnosis Date Noted    BPH (benign prostatic hyperplasia) 01/16/2015    Angioedema 05/08/2017    Chronic idiopathic urticaria 05/08/2017    Numbness and tingling of upper extremity 03/11/2019    Bilateral carpal tunnel syndrome 03/11/2019    Annual physical exam 08/19/2020    Nonspecific abnormal electrocardiogram (ECG) (EKG) 08/19/2020    Mixed hyperlipidemia 08/19/2020    Former smoker 11/23/2020    Bilateral carotid artery stenosis 06/02/2021    Right hip pain 12/18/2024    Right inguinal pain 12/18/2024    Impacted cerumen of both ears 03/17/2025    Gastroesophageal reflux disease 03/17/2025     Resolved Ambulatory Problems     Diagnosis Date Noted    No Resolved Ambulatory Problems     Past Medical History:   Diagnosis Date    Allergy     Angio-edema        Past Surgical History  He has a past surgical history that includes right shoulder replacement; Hernia repair; Tonsillectomy; Sinus surgery; Adenoidectomy; Spine surgery; and Carpal tunnel release  (Bilateral).    Past Surgical History:   Procedure Laterality Date    ADENOIDECTOMY      CARPAL TUNNEL RELEASE Bilateral     HERNIA REPAIR      right shoulder replacement      SINUS SURGERY      rhinoplasty    SPINE SURGERY      Four lumbar spine surgeries    TONSILLECTOMY          Family History  His family history includes Cancer in his father and mother; Lung cancer in his father; Suicide in his sister; Tuberculosis in his mother.    Social History  He reports that he has quit smoking. His smoking use included cigarettes. He has a 34 pack-year smoking history. He has been exposed to tobacco smoke. He quit smokeless tobacco use about 45 years ago. He reports that he does not drink alcohol and does not use drugs.    Allergies  He is allergic to diagnostic aids, otherwise unspecified.    Medications  He has a current medication list which includes the following prescription(s): aspirin, atorvastatin, cetirizine, cyanocobalamin, finasteride, glucosamine-chondroitin, ibuprofen, melatonin, montelukast, multivitamin, omeprazole, tamsulosin, and turmeric.    ROS:  Pertinent positive and negative review of systems as noted in HPI.     Objective:     There were no vitals taken for this visit.   General Appearance:   Awake, Alert and Oriented. NAD. Appropriate affect and appearance      Neuro:   Spontaneous eye opening, appropriate verbal responses, follows commands  Pupils equal, round & brisk. EOMI, no proptosis  Face is symmetric, HB I, non-edematous bilaterally  Vision grossly intact, Hearing grossly intact     Head and Face:   skin is intact with no lesions noted.  Parotid and submandibular glands are symmetric and non-tender.      Ears:  Periauricular regions non-erythematous, non-fluctuanct non-tender  Pinna normal bilaterally, no skin lesions  EACs patent and without drainage bilaterally   Tympanic membranes are normal in appearance bilaterally.  No middle ear effusion noted bilaterally.    Nose:   External nose is  symmetric, no skin lesions  Septum midline, No inferior turbinate hypertrophy, No polyps or rhinorrhea     OC/OP:  Tongue midline on extension, non-edematous, soft  No labial, buccal, oral tongue or floor of mouth lesions  Soft palate symmetric, midline and without lesions or masses, tonsils symmetric  No masses or lesions of the visualized oropharynx     Neck:  Neck is symmetric, non-edematous, non-erythematous  Trachea is midline and easily palpable,  No palpable adenopathy or masses in levels I-VI  No thyroid nodules or masses, non-tender      Respiratory:  Normal work of breathing, no accessory muscle use, no stridor     Voice:  Normal vocal quality, volume and articulation    Data Review:   LABS    IMAGING      AUDIO    I independently reviewed the tracings of the complete audiometric evaluation performed today.  I reviewed the audiogram with the patient as well.  Pertinent findings include mild to severe SNHL AU.      Procedures:   Procedure Note:  The patient was brought to the minor procedure room and placed under the operating microscope of the left ear canal which was cleaned of ceruminous debris. Using a combination of suction, curettes and/or alligator forceps the patient's cerumen was removed. The patient tolerated the procedure well. There were no complications.  Procedure Note:  The patient was brought to the minor procedure room and placed under the operating microscope of the right ear canal which was cleaned of ceruminous debris. Using a combination of suction, curettes and/or alligator forceps the patient's cerumen was removed. The patient tolerated the procedure well. There were no complications.    Assessment:     1. Impacted cerumen of both ears    2. Sensorineural hearing loss (SNHL) of both ears        Plan:     I had a long discussion with the patient regarding his condition and the further workup and management options. Audiometric testing interpretation consistent with sensorineural  hearing loss. Discussed the etiology of SNHL. Medically cleared for hearing amplification, and will follow-up with Audiology if interested. Hearing conservation in noisy environments. Bilateral cerumen impaction removed under microscopy. Patient tolerated procedure well and noted improvement upon impaction removal. Education about normal ear hygiene and the avoidance of Q-tips was reviewed. RTC every year for regular ear cleanings.       No orders of the defined types were placed in this encounter.         Problem List Items Addressed This Visit       Impacted cerumen of both ears - Primary     Other Visit Diagnoses         Sensorineural hearing loss (SNHL) of both ears